# Patient Record
Sex: FEMALE | HISPANIC OR LATINO | Employment: FULL TIME | URBAN - METROPOLITAN AREA
[De-identification: names, ages, dates, MRNs, and addresses within clinical notes are randomized per-mention and may not be internally consistent; named-entity substitution may affect disease eponyms.]

---

## 2017-02-07 ENCOUNTER — NON-PROVIDER VISIT (OUTPATIENT)
Dept: URGENT CARE | Facility: PHYSICIAN GROUP | Age: 37
End: 2017-02-07

## 2017-02-07 DIAGNOSIS — Z02.83 EMPLOYMENT-RELATED DRUG TESTING, ENCOUNTER FOR: ICD-10-CM

## 2017-02-07 LAB
BREATH ALCOHOL COMMENT: NORMAL
POC BREATHALIZER: 0 PERCENT (ref 0–0.01)

## 2017-02-07 PROCEDURE — 82075 ASSAY OF BREATH ETHANOL: CPT | Performed by: FAMILY MEDICINE

## 2017-02-07 PROCEDURE — 7503 PR ESCREEN ACCT UDS COL ONLY: Performed by: PHYSICIAN ASSISTANT

## 2017-04-13 ENCOUNTER — NON-PROVIDER VISIT (OUTPATIENT)
Dept: URGENT CARE | Facility: PHYSICIAN GROUP | Age: 37
End: 2017-04-13

## 2017-04-13 DIAGNOSIS — Z02.83 ENCOUNTER FOR DRUG SCREENING: ICD-10-CM

## 2017-04-13 LAB
BREATH ALCOHOL COMMENT: NORMAL
POC BREATHALIZER: 0 PERCENT (ref 0–0.01)

## 2017-04-13 PROCEDURE — 82075 ASSAY OF BREATH ETHANOL: CPT | Performed by: FAMILY MEDICINE

## 2017-04-13 PROCEDURE — 7503 PR ESCREEN ACCT UDS COL ONLY: Performed by: FAMILY MEDICINE

## 2017-06-20 ENCOUNTER — OFFICE VISIT (OUTPATIENT)
Dept: MEDICAL GROUP | Facility: PHYSICIAN GROUP | Age: 37
End: 2017-06-20
Payer: COMMERCIAL

## 2017-06-20 VITALS
OXYGEN SATURATION: 97 % | WEIGHT: 148.2 LBS | HEIGHT: 61 IN | DIASTOLIC BLOOD PRESSURE: 80 MMHG | BODY MASS INDEX: 27.98 KG/M2 | TEMPERATURE: 98.8 F | HEART RATE: 70 BPM | SYSTOLIC BLOOD PRESSURE: 118 MMHG | RESPIRATION RATE: 12 BRPM

## 2017-06-20 DIAGNOSIS — Z01.30 VISIT FOR BLOOD PRESSURE EXAMINATION: ICD-10-CM

## 2017-06-20 DIAGNOSIS — K21.9 GASTROESOPHAGEAL REFLUX DISEASE WITHOUT ESOPHAGITIS: ICD-10-CM

## 2017-06-20 DIAGNOSIS — E78.5 HYPERLIPIDEMIA, UNSPECIFIED HYPERLIPIDEMIA TYPE: ICD-10-CM

## 2017-06-20 PROCEDURE — 99204 OFFICE O/P NEW MOD 45 MIN: CPT | Performed by: NURSE PRACTITIONER

## 2017-06-20 ASSESSMENT — PATIENT HEALTH QUESTIONNAIRE - PHQ9: CLINICAL INTERPRETATION OF PHQ2 SCORE: 0

## 2017-06-20 NOTE — MR AVS SNAPSHOT
"        Joellen Wakefield   2017 4:40 PM   Office Visit   MRN: [id number]    Department:  [facility]   Dept Phone:  [phone]    Description:  Female : 1980   Provider:  JOSE CRUZ Griffith           Reason for Visit     Establish Care     Orders Needed Labs       Allergies as of 2017     No Known Allergies      You were diagnosed with     Hyperlipidemia, unspecified hyperlipidemia type   [1524972]       Gastroesophageal reflux disease without esophagitis   [312761]         Vital Signs     Blood Pressure Pulse Temperature Respirations Height Weight    118/80 mmHg 70 37.1 °C (98.8 °F) 12 1.549 m (5' 0.98\") 67.223 kg (148 lb 3.2 oz)    Body Mass Index Oxygen Saturation Last Menstrual Period Breastfeeding? Smoking Status       28.02 kg/m2 97% 2017 No Never Smoker        Basic Information     Date Of Birth Sex Race Ethnicity Preferred Language    1980 Female  or   Origin (Polish,St Helenian,[place]i,Italian, etc) English      Problem List              ICD-10-CM Priority Class Noted - Resolved    Hyperlipidemia E78.5   2017 - Present    Gastroesophageal reflux disease without esophagitis K21.9   2017 - Present      Health Maintenance        Date Due Completion Dates    IMM DTaP/Tdap/Td Vaccine (1 - Tdap) 1997 ---    PAP SMEAR 1999 ---            Current Immunizations     No immunizations on file.      Below and/or attached are the medications your provider expects you to take. Review all of your home medications and newly ordered medications with your provider and/or pharmacist. Follow medication instructions as directed by your provider and/or pharmacist. Please keep your medication list with you and share with your provider. Update the information when medications are discontinued, doses are changed, or new medications (including over-the-counter products) are added; and carry medication information at all times in the " event of emergency situations     Allergies:  No Known Allergies          Medications  Valid as of: June 20, 2017 -  5:18 PM    Generic Name Brand Name Tablet Size Instructions for use    .                 Medicines prescribed today were sent to:     [facility] #[id number] - [place], [place] - [address]    [address] [place] [address]    Phone: [phone] Fax: [phone]    Open 24 Hours?: No      Medication refill instructions:       If your prescription bottle indicates you have medication refills left, it is not necessary to call your provider’s office. Please contact your pharmacy and they will refill your medication.    If your prescription bottle indicates you do not have any refills left, you may request refills at any time through one of the following ways: The online Resilinc system (except Urgent Care), by calling your provider’s office, or by asking your pharmacy to contact your provider’s office with a refill request. Medication refills are processed only during regular business hours and may not be available until the next business day. Your provider may request additional information or to have a follow-up visit with you prior to refilling your medication.   *Please Note: Medication refills are assigned a new Rx number when refilled electronically. Your pharmacy may indicate that no refills were authorized even though a new prescription for the same medication is available at the pharmacy. Please request the medicine by name with the pharmacy before contacting your provider for a refill.        Your To Do List     Future Labs/Procedures Complete By Expires    COMP METABOLIC PANEL  As directed 6/21/2018    LIPID PROFILE  As directed 6/21/2018         Resilinc Access Code: [email or web address]  Expires: 7/20/2017  4:02 PM    Resilinc  A secure, online tool to manage your health information     [employer]’s Resilinc® is a secure, online tool that connects you to your personalized  health information from the privacy of your home -- day or night - making it very easy for you to manage your healthcare. Once the activation process is completed, you can even access your medical information using the Mapplas yun, which is available for free in the Apple Yun store or Google Play store.     Mapplas provides the following levels of access (as shown below):   My Chart Features   [facility] Doctor [facility]  Specialists [facility]  Urgent  Care Non-[facility]  Primary Care  Doctor   Email your healthcare team securely and privately 24/7 X X X    Manage appointments: schedule your next appointment; view details of past/upcoming appointments X      Request prescription refills. X      View recent personal medical records, including lab and immunizations X X X X   View health record, including health history, allergies, medications X X X X   Read reports about your outpatient visits, procedures, consult and ER notes X X X X   See your discharge summary, which is a recap of your hospital and/or ER visit that includes your diagnosis, lab results, and care plan. X X       How to register for Mapplas:  1. Go to  [email or web address].  2. Click on the Sign Up Now box, which takes you to the New Member Sign Up page. You will need to provide the following information:  a. Enter your Mapplas Access Code exactly as it appears at the top of this page. (You will not need to use this code after you’ve completed the sign-up process. If you do not sign up before the expiration date, you must request a new code.)   b. Enter your date of birth.   c. Enter your home email address.   d. Click Submit, and follow the next screen’s instructions.  3. Create a Mapplas ID. This will be your Mapplas login ID and cannot be changed, so think of one that is secure and easy to remember.  4. Create a Mapplas password. You can change your password at any time.  5. Enter your Password Reset Question and Answer. This can be used at a  later time if you forget your password.   6. Enter your e-mail address. This allows you to receive e-mail notifications when new information is available in [employer].  7. Click Sign Up. You can now view your health information.    For assistance activating your [employer] account, call [phone]

## 2017-06-21 PROBLEM — Z01.30: Status: ACTIVE | Noted: 2017-06-21

## 2017-06-21 NOTE — ASSESSMENT & PLAN NOTE
Patient states that her blood pressure fluctuates. She currently does not check her. She denies any chest pain, diaphoresis, shortness of breath, headaches, dizziness or blurred vision. She was advised t keep a blood pressure log to follow-up and review in 4 weeks

## 2017-06-21 NOTE — PROGRESS NOTES
Chief Complaint   Patient presents with   • Establish Care   • Orders Needed     Labs        HPI:    Joellen Wakefield is a 36 y.o. female here to establish care and to discuss the following:    Hyperlipidemia  Chronic Condition: Patient has hyperlipidemia which is diet controlled. She denies any chest pain, diaphoresis, shortness of breath, headaches, dizziness, blurred vision or myalgias. Patient is a  Non-smoker and rarely drinks alcohol .       Gastroesophageal reflux disease without esophagitis  Chronic condition: Patient reports GERD for many years controlled well with omeprazole when necessary. Patient denies chest pain, pyrosis, regurgitation, chronic cough, dysphagia, chronic sore throat or hoarseness. Patient is a  Non-smokerandsocial drinker. Seen by gastroenterology several years ago and treated with medication. Endoscopy not necessary.    Visit for blood pressure examination  Patient states that her blood pressure fluctuates. She currently does not check her. She denies any chest pain, diaphoresis, shortness of breath, headaches, dizziness or blurred vision. She was advised t keep a blood pressure log to follow-up and review in 4 weeks        Current medicines (including changes today)  No current outpatient prescriptions on file.     No current facility-administered medications for this visit.     She  has a past medical history of Indigestion; Cold; Cold; Heart burn; and Hyperlipidemia.  She  has past surgical history that includes other orthopedic surgery and tubal coagulation laparoscopic bilateral (2/6/2012).  Social History   Substance Use Topics   • Smoking status: Never Smoker    • Smokeless tobacco: Never Used   • Alcohol Use: 0.0 oz/week     0 Standard drinks or equivalent per week      Comment: one drink every 2 weeks     Social History     Social History Narrative     History reviewed. No pertinent family history.  No family status information on file.         ROS    Review of Systems  "  Constitutional: Negative for fever, chills, weight loss and malaise/fatigue.   HENT: Negative for ear pain, nosebleeds, congestion, sore throat and neck pain.    Eyes: Negative for blurred vision.   Respiratory: Negative for cough, sputum production, shortness of breath and wheezing.    Cardiovascular: Negative for chest pain, palpitations,  and leg swelling.   Gastrointestinal: Negative for  nausea, vomiting, diarrhea and abdominal pain. Positive for intermittent GERD  Genitourinary: Negative for dysuria, urgency and frequency.   Musculoskeletal: Negative for myalgias, back pain and joint pain.   Skin: Negative for rash and itching.   Neurological: Negative for dizziness, tingling, tremors, sensory change, focal weakness and headaches.   Endo/Heme/Allergies: Does not bruise/bleed easily.   Psychiatric/Behavioral: Negative for depression, anxiety, suicidal ideas, insomnia and memory loss.      All other systems reviewed and are negative except as in HPI.         Objective:     Blood pressure 118/80, pulse 70, temperature 37.1 °C (98.8 °F), resp. rate 12, height 1.549 m (5' 0.98\"), weight 67.223 kg (148 lb 3.2 oz), last menstrual period 05/20/2017, SpO2 97 %, not currently breastfeeding. Body mass index is 28.02 kg/(m^2).  Physical Exam:  Constitutional: Alert, no distress.  Skin: Warm, dry, good turgor, no rashes in visible areas.  Eye: Equal, round and reactive, conjunctiva clear, lids normal.  ENMT: Lips without lesions, good dentition, oropharynx clear. Ear canals are clear, TMs within normal limits bilaterally.   Neck: Trachea midline, no masses, no thyromegaly. No cervical or supraclavicular lymphadenopathy.  Respiratory: Unlabored respiratory effort, lungs clear to auscultation, no wheezes, no ronchi.  Cardiovascular: Normal S1, S2, no murmur, no edema.  Abdomen: Soft, non-tender, no masses, no hepatosplenomegaly.  Psych: Alert and oriented x3, normal affect and mood.  MS: Ambulates independently with steady " gait. Has full range of motion of all extremities and spine.  Neuro: Cranial nerves intact. DTRs within normal limits. No neurological deficits.        Assessment and Plan:   The following treatment plan was discussed   1. Hyperlipidemia, unspecified hyperlipidemia type  COMP METABOLIC PANEL    LIPID PROFILE    labs ordered   2. Gastroesophageal reflux disease without esophagitis      Continue current medication   3. Visit for blood pressure examination       Records requested.  Followup: Return in about 4 weeks (around 7/18/2017) for Evaluate BP log.   Please note that this dictation was created using voice recognition software. I have made every reasonable attempt to correct obvious errors, but I expect that there are errors of grammar and possibly content that I did not discover before finalizing the note.

## 2017-06-21 NOTE — ASSESSMENT & PLAN NOTE
Chronic Condition: Patient has hyperlipidemia which is diet controlled. She denies any chest pain, diaphoresis, shortness of breath, headaches, dizziness, blurred vision or myalgias. Patient is a  Non-smoker and rarely drinks alcohol .

## 2017-06-21 NOTE — ASSESSMENT & PLAN NOTE
Chronic condition: Patient reports GERD for many years controlled well with omeprazole when necessary. Patient denies chest pain, pyrosis, regurgitation, chronic cough, dysphagia, chronic sore throat or hoarseness. Patient is a  Non-smokerandsocial drinker. Seen by gastroenterology several years ago and treated with medication. Endoscopy not necessary.

## 2017-11-20 ENCOUNTER — NON-PROVIDER VISIT (OUTPATIENT)
Dept: URGENT CARE | Facility: PHYSICIAN GROUP | Age: 37
End: 2017-11-20

## 2017-11-20 DIAGNOSIS — Z02.83 ENCOUNTER FOR DRUG SCREENING: ICD-10-CM

## 2017-11-20 PROCEDURE — 7503 PR ESCREEN ACCT UDS COL ONLY: Performed by: EMERGENCY MEDICINE

## 2019-02-27 ENCOUNTER — OFFICE VISIT (OUTPATIENT)
Dept: URGENT CARE | Facility: PHYSICIAN GROUP | Age: 39
End: 2019-02-27
Payer: COMMERCIAL

## 2019-02-27 VITALS
DIASTOLIC BLOOD PRESSURE: 88 MMHG | HEART RATE: 72 BPM | SYSTOLIC BLOOD PRESSURE: 124 MMHG | BODY MASS INDEX: 29.27 KG/M2 | HEIGHT: 61 IN | WEIGHT: 155 LBS | OXYGEN SATURATION: 98 % | TEMPERATURE: 97.9 F

## 2019-02-27 DIAGNOSIS — Z87.09 HISTORY OF INFLUENZA: ICD-10-CM

## 2019-02-27 DIAGNOSIS — R49.1 LOSS OF VOICE: ICD-10-CM

## 2019-02-27 PROCEDURE — 99214 OFFICE O/P EST MOD 30 MIN: CPT | Performed by: PHYSICIAN ASSISTANT

## 2019-02-27 RX ORDER — CHLORAL HYDRATE 500 MG
1000 CAPSULE ORAL
COMMUNITY
End: 2022-01-16

## 2019-02-27 RX ORDER — METHYLPREDNISOLONE 4 MG/1
TABLET ORAL
Qty: 1 KIT | Refills: 0 | Status: SHIPPED | OUTPATIENT
Start: 2019-02-27 | End: 2019-03-20

## 2019-02-27 RX ORDER — OSELTAMIVIR PHOSPHATE 75 MG/1
CAPSULE ORAL
Refills: 0 | COMMUNITY
Start: 2019-02-24 | End: 2019-03-20

## 2019-02-27 RX ORDER — ASCORBIC ACID 500 MG
500 TABLET ORAL DAILY
COMMUNITY
End: 2019-06-12

## 2019-02-27 ASSESSMENT — ENCOUNTER SYMPTOMS
SINUS PAIN: 0
NEUROLOGICAL NEGATIVE: 1
HEARTBURN: 0
FEVER: 0
STRIDOR: 0
CONSTIPATION: 0
VOMITING: 0
BLURRED VISION: 0
DOUBLE VISION: 0
SORE THROAT: 0
ABDOMINAL PAIN: 0
CHILLS: 0
DIARRHEA: 0
COUGH: 0
NAUSEA: 0

## 2019-02-27 NOTE — LETTER
February 27, 2019         Patient: Joellen Wakefield   YOB: 1980   Date of Visit: 2/27/2019           To Whom it May Concern:    Joellen Wakefield was seen in my clinic on 2/27/2019.  Please excuse on February 27 and February 28, 2019  If you have any questions or concerns, please don't hesitate to call.        Sincerely,           Kay Billingsley P.A.-C.  Electronically Signed

## 2019-02-28 NOTE — PROGRESS NOTES
"Subjective:      Joellen Wakefield is a 38 y.o. female who presents with Laryngitis (x1 month, sore throat, coughing up blood with phlegm, fever x2 days)            Patient reports 1 month history of loss of voice.  She states her voice is hoarse.  She states she has been told she has reflux but is not sure if she does.  Denies any history of problems with her thyroid.  Denies painful swallowing, belching or loss of weight or appetite.  Denies abdominal pain.  Was seen last week and treated for influenza.  She was given Tamiflu.  This made her voice loss worse.  Nothing makes better nothing makes worse.  She is tried only Tamiflu which seems to have helped her cough but not her loss of voice    Past medical history: Is not pertinent to this examination  Past surgical history: Not pertinent to this examination  Family history: Is not pertinent to this examination  Allergies: No known drug allergies  Social history: Is reviewed in Epic  Meds: She just finished Tamiflu        Review of Systems   Constitutional: Negative for chills and fever.   HENT: Negative for congestion, ear discharge, ear pain, nosebleeds, sinus pain and sore throat.    Eyes: Negative for blurred vision and double vision.   Respiratory: Negative for cough and stridor.    Cardiovascular: Negative for chest pain.   Gastrointestinal: Negative for abdominal pain, constipation, diarrhea, heartburn, nausea and vomiting.   Genitourinary: Negative.    Skin: Negative for rash.   Neurological: Negative.           Objective:     /88 (BP Location: Right arm, Patient Position: Sitting, BP Cuff Size: Adult)   Pulse 72   Temp 36.6 °C (97.9 °F) (Temporal)   Ht 1.549 m (5' 0.98\")   Wt 70.3 kg (155 lb)   LMP 02/13/2019   SpO2 98%   BMI 29.31 kg/m²      Physical Exam         Gen.: Patient is A and O ×3, no acute distress, well-nourished well-hydrated  Vitals: Are listed and unremarkable  HEENT: Heads normocephalic, atraumatic, PERRLA, extraocular " movements intact, TMs and oropharynx clear  Neck: Soft supple without cervical lymphadenopathy.  Questionable thyroidmegaly  Cardiovascular: Regular rate and rhythm normal S1 and S2. No murmurs, rubs or gallops  Lungs are clear to auscultation bilaterally. no wheezes rales or rhonchi  Abdomen is soft, nontender, nondistended with good bowel sounds, no hepatosplenomegaly  Skin: Is well perfused without evidence of rash or lesions  Neurological:  cranial nerves II through XII were assessed and intact.  Musculoskeletal: Full range of motion, 5 out of 5 strength against resistance  Neurovascularly: Intact with a 2 second cap refill, good distal pulses      Assessment/Plan:     1. History of influenza  -patient treated last week with tamiflu. Has improved.  Discussed viral etiology at length.    2. Loss of voice  Has been one month. She needs further evaluation given questionable thyroid megaly on exam.  Urgent referral to ENT  - REFERRAL TO ENT        Did send a Medrol Dosepak to the pharmacy as patient states symptoms worsened while having respiratory infection.  Discussed voicebox rest, drinking tea and will do short course of steroids.  Follow-up as needed

## 2019-03-20 ENCOUNTER — OFFICE VISIT (OUTPATIENT)
Dept: MEDICAL GROUP | Facility: PHYSICIAN GROUP | Age: 39
End: 2019-03-20
Payer: COMMERCIAL

## 2019-03-20 VITALS
BODY MASS INDEX: 27.46 KG/M2 | SYSTOLIC BLOOD PRESSURE: 118 MMHG | TEMPERATURE: 98.5 F | DIASTOLIC BLOOD PRESSURE: 84 MMHG | WEIGHT: 155 LBS | OXYGEN SATURATION: 98 % | HEIGHT: 63 IN | HEART RATE: 78 BPM | RESPIRATION RATE: 12 BRPM

## 2019-03-20 DIAGNOSIS — E78.5 HYPERLIPIDEMIA, UNSPECIFIED HYPERLIPIDEMIA TYPE: ICD-10-CM

## 2019-03-20 DIAGNOSIS — Z12.31 ENCOUNTER FOR SCREENING MAMMOGRAM FOR MALIGNANT NEOPLASM OF BREAST: ICD-10-CM

## 2019-03-20 DIAGNOSIS — Z11.59 ENCOUNTER FOR HEPATITIS C SCREENING TEST FOR LOW RISK PATIENT: ICD-10-CM

## 2019-03-20 DIAGNOSIS — R03.0 ELEVATED BLOOD PRESSURE READING: ICD-10-CM

## 2019-03-20 DIAGNOSIS — R53.83 FATIGUE, UNSPECIFIED TYPE: ICD-10-CM

## 2019-03-20 DIAGNOSIS — R73.9 HYPERGLYCEMIA: ICD-10-CM

## 2019-03-20 DIAGNOSIS — Z11.3 ROUTINE SCREENING FOR STI (SEXUALLY TRANSMITTED INFECTION): ICD-10-CM

## 2019-03-20 DIAGNOSIS — E53.8 VITAMIN B12 DEFICIENCY: ICD-10-CM

## 2019-03-20 DIAGNOSIS — Z51.81 MEDICATION MONITORING ENCOUNTER: ICD-10-CM

## 2019-03-20 DIAGNOSIS — K76.0 FATTY LIVER: ICD-10-CM

## 2019-03-20 DIAGNOSIS — R01.1 CARDIAC MURMUR: ICD-10-CM

## 2019-03-20 DIAGNOSIS — K21.9 GASTROESOPHAGEAL REFLUX DISEASE WITHOUT ESOPHAGITIS: ICD-10-CM

## 2019-03-20 DIAGNOSIS — E55.9 VITAMIN D DEFICIENCY: ICD-10-CM

## 2019-03-20 DIAGNOSIS — R74.8 ELEVATED LIVER ENZYMES: ICD-10-CM

## 2019-03-20 PROBLEM — Z01.30: Status: RESOLVED | Noted: 2017-06-21 | Resolved: 2019-03-20

## 2019-03-20 PROCEDURE — 99214 OFFICE O/P EST MOD 30 MIN: CPT | Performed by: FAMILY MEDICINE

## 2019-03-20 RX ORDER — FLUTICASONE PROPIONATE 50 MCG
SPRAY, SUSPENSION (ML) NASAL
Refills: 0 | COMMUNITY
Start: 2019-03-14 | End: 2019-06-12 | Stop reason: SDUPTHER

## 2019-03-20 RX ORDER — OMEPRAZOLE 20 MG/1
CAPSULE, DELAYED RELEASE ORAL
Refills: 0 | COMMUNITY
Start: 2019-03-14 | End: 2019-03-20 | Stop reason: SDUPTHER

## 2019-03-20 RX ORDER — OMEPRAZOLE 20 MG/1
20 CAPSULE, DELAYED RELEASE ORAL DAILY
Qty: 30 CAP | Refills: 1 | Status: SHIPPED | OUTPATIENT
Start: 2019-03-20 | End: 2019-06-12 | Stop reason: SDUPTHER

## 2019-03-20 ASSESSMENT — PATIENT HEALTH QUESTIONNAIRE - PHQ9: CLINICAL INTERPRETATION OF PHQ2 SCORE: 0

## 2019-03-20 NOTE — PROGRESS NOTES
cc: BMI 27, history of elevated liver enzymes and fatty liver, GERD, employment screening with hyperglycemia    Subjective:     Joellen Wakefield is a 38 y.o. female presenting BMI 27, history of elevated liver enzymes and fatty liver, GERD, employment screening with hyperglycemia.  She denies any recent ER visits or hospitalizations.  She is here to establish care with myself.  February 27 was seen in urgent care here due to loss of voice and history of influenza where she was treated with Tamiflu and improved because her loss of voice has been for 1 month, she was referred to the ear nose and throat specialist.  April 11 she has appointment with ENT.  She has history of elevated liver function test and when she saw her previous doctor at [facility] she was told she had fatty liver.  She has not seen a dietitian before but was told to work on her weight due to her fatty liver and she did have hyperglycemia at her employment exam but never any diabetes but does have family history with both parents with diabetes.  Lives with her kids and boyfriend. two girls 19,16, and 10 and one boy. Works full time web planner. No social or domestic concerns. She reports transient elevated blood pressure. But BP okay today.  She reports sometimes that her head bobs and she could feel her heartbeat in her ears.  She denies knowing of any murmur from before.  She denies any chest pain or palpitations.  Informant screening total cholesterol was 171 and triglycerides was 117 previous ALT patient reports was 53 as she had that in her phone.  Review of systems:     Constitutional: Negative for fever, chills and positive fatigue.   HENT: Negative for sinus pressure, negative for ear pain or hearing loss  Eyes: Negative for blurriness, negative for double vision  Respiratory: Negative for cough and shortness of breath, negative for exertional shortness of breath  Cardiovascular: Negative for leg swelling, negative for palpitations,  "negative for chest pain  Gastrointestinal: Negative for nausea, vomiting, abdominal pain, constipation and diarrhea.  Genitourinary: Negative for dysuria and hematuria.   Skin: Negative for rash.   Neurological: Positive for dizziness, Negative focal weakness and headaches.   Endo/Heme/Allergies: Denies bleeding, bruising, and recurrent allergies.  Psychiatric/Behavioral: Negative for depression and anxiety.        Current Outpatient Prescriptions:   •  fluticasone (FLONASE) 50 MCG/ACT nasal spray, , Disp: , Rfl: 0  •  omeprazole (PRILOSEC) 20 MG delayed-release capsule, , Disp: , Rfl: 0  •  Omega-3 Fatty Acids (FISH OIL) 1000 MG Cap capsule, Take 1,000 mg by mouth 3 times a day, with meals., Disp: , Rfl:   •  ascorbic acid (ASCORBIC ACID) 500 MG Tab, Take 500 mg by mouth every day., Disp: , Rfl:     Allergies, past medical history, past surgical history, family history, social history reviewed and updated    Objective:     Vitals: /84 (BP Location: Left arm, Patient Position: Sitting, BP Cuff Size: Adult)   Pulse 78   Temp 36.9 °C (98.5 °F) (Temporal)   Resp 12   Ht 1.588 m (5' 2.5\")   Wt 70.3 kg (155 lb)   LMP 02/25/2019 (Approximate)   SpO2 98%   Breastfeeding? No   BMI 27.90 kg/m²   General: Alert, pleasant, NAD  HEENT: Normocephalic.  Nontraumatic. EOMI, no icterus or pallor.  Conjunctivae and lids normal. External ears normal. Oropharynx non-erythematous, mucous membranes moist.  Neck supple.  No thyromegaly or masses palpated. No cervical or supraclavicular lymphadenopathy.  Heart: Regular rate and rhythm.  S1 and S2 normal.  No murmurs appreciated.  Respiratory: Normal respiratory effort.  Clear to auscultation bilaterally.  Abdomen: Non-distended, soft, non tender in all 4 quadrants.  Skin: Warm, dry, no rashes.  Musculoskeletal: Gait is normal.  Moves all extremities well.  Extremities: No leg edema.  Pedal pulses 2+ symmetric.   Psych:  Affect/mood is normal, judgement is good, memory is " intact, grooming is appropriate.    Assessment/Plan:     Diagnoses and all orders for this visit:    BMI 27.0-27.9,adult  -     REFERRAL TO [facility] (HIP) Services Requested: Registered Dietitian for Medical Nutrition Therapy; Reason for Visit: BMI of 25 or higher and Fasting Glucose 100-125    Encounter for screening mammogram for malignant neoplasm of breast  -     MA-SCREEN MAMMO W/CAD-BILAT; Future    Hyperlipidemia, unspecified hyperlipidemia type  -     Lipid Profile; Future    Gastroesophageal reflux disease without esophagitis    Fatigue, unspecified type  -     CBC WITH DIFFERENTIAL; Future  -     TSH WITH REFLEX TO FT4; Future    Medication monitoring encounter  -     Comp Metabolic Panel; Future    Hyperglycemia  -     HEMOGLOBIN A1C; Future  -     REFERRAL TO [facility] (HIP) Services Requested: Registered Dietitian for Medical Nutrition Therapy; Reason for Visit: BMI of 25 or higher and Fasting Glucose 100-125    Elevated liver enzymes  -     Comp Metabolic Panel; Future  -     HEPATITIS PANEL ACUTE(4 COMPONENTS); Future    Vitamin D deficiency  -     VITAMIN D,25 HYDROXY; Future    Vitamin B12 deficiency  -     VITAMIN B12; Future    Encounter for hepatitis C screening test for low risk patient  -     HEPATITIS PANEL ACUTE(4 COMPONENTS); Future    Routine screening for STI (sexually transmitted infection)  -     Chlamydia/GC PCR Urine Or Swab; Future  -     HEPATITIS PANEL ACUTE(4 COMPONENTS); Future  -     HIV AG/AB COMBO ASSAY SCREENING; Future  -     RPR (SYPHILIS); Future    Fatty liver  -     REFERRAL TO [facility] (HIP) Services Requested: Registered Dietitian for Medical Nutrition Therapy; Reason for Visit: BMI of 25 or higher and Fasting Glucose 100-125    Elevated blood pressure reading    -Due to her head Alex issues before you see me for my appointment, please see the eye doctor and please let the ear nose throat doctor  know as well when you see him April 11.  Continue daily on an empty stomach in the morning omeprazole for GERD once daily for at least 30 days refilled with one other refill.  Extensive patient instructions given on diet for GERD.  Also for BMI 27 and history of fatty liver will send to the dietitian.  2 weeks before your next appointment please get fasting lab work 8 hours of no eating but drink water and we will get an A1c for hyperglycemia.  She is due for her breast cancer screening and will send her for mammogram as well.  Also due to her new murmur and her current symptoms will get ultrasound echo of the heart.  Also recommend getting a blood pressure cuff and checking the blood pressure in both arms the top number and bottom number and heart rate checking sometimes in the morning, afternoon, or evening every second day until you see me and putting these values on the form that is in the patient instruction.  Ideal blood pressure is 120s over 80s.  Patient instructions given on diet and blood pressure.  ER precautions given that if any chest pain, shortness of breath, passing out then please go to the ER or call 911.  Also please request to have records sent from the ear nose throat doctor to ask your primary care doctor and also please send over forms from her employment screening labs as well.    Return in about 6 weeks (around 4/30/2019), or FU labs/ears/BP, for Long, 40 min appnt.

## 2019-03-20 NOTE — PATIENT INSTRUCTIONS
Diagnoses and all orders for this visit:    BMI 27.0-27.9,adult  -     REFERRAL TO [facility] (HIP) Services Requested: Registered Dietitian for Medical Nutrition Therapy; Reason for Visit: BMI of 25 or higher and Fasting Glucose 100-125    Encounter for screening mammogram for malignant neoplasm of breast  -     MA-SCREEN MAMMO W/CAD-BILAT; Future    Hyperlipidemia, unspecified hyperlipidemia type  -     Lipid Profile; Future    Gastroesophageal reflux disease without esophagitis    Fatigue, unspecified type  -     CBC WITH DIFFERENTIAL; Future  -     TSH WITH REFLEX TO FT4; Future    Medication monitoring encounter  -     Comp Metabolic Panel; Future    Hyperglycemia  -     HEMOGLOBIN A1C; Future  -     REFERRAL TO [facility] (HIP) Services Requested: Registered Dietitian for Medical Nutrition Therapy; Reason for Visit: BMI of 25 or higher and Fasting Glucose 100-125    Elevated liver enzymes  -     Comp Metabolic Panel; Future  -     HEPATITIS PANEL ACUTE(4 COMPONENTS); Future    Vitamin D deficiency  -     VITAMIN D,25 HYDROXY; Future    Vitamin B12 deficiency  -     VITAMIN B12; Future    Encounter for hepatitis C screening test for low risk patient  -     HEPATITIS PANEL ACUTE(4 COMPONENTS); Future    Routine screening for STI (sexually transmitted infection)  -     Chlamydia/GC PCR Urine Or Swab; Future  -     HEPATITIS PANEL ACUTE(4 COMPONENTS); Future  -     HIV AG/AB COMBO ASSAY SCREENING; Future  -     RPR (SYPHILIS); Future    Fatty liver  -     REFERRAL TO [facility] (HIP) Services Requested: Registered Dietitian for Medical Nutrition Therapy; Reason for Visit: BMI of 25 or higher and Fasting Glucose 100-125    Elevated blood pressure reading    -Due to her head Alex issues before you see me for my appointment, please see the eye doctor and please let the ear nose throat doctor know as well when you see him April 11.  Continue daily  on an empty stomach in the morning omeprazole for GERD once daily for at least 30 days refilled with one other refill.  Extensive patient instructions given on diet for GERD.  Also for BMI 27 and history of fatty liver will send to the dietitian.  2 weeks before your next appointment please get fasting lab work 8 hours of no eating but drink water and we will get an A1c for hyperglycemia.  She is due for her breast cancer screening and will send her for mammogram as well.  Also due to her new murmur and her current symptoms will get ultrasound echo of the heart.  Also recommend getting a blood pressure cuff and checking the blood pressure in both arms the top number and bottom number and heart rate checking sometimes in the morning, afternoon, or evening every second day until you see me and putting these values on the form that is in the patient instruction.  Ideal blood pressure is 120s over 80s.  Patient instructions given on diet and blood pressure.  ER precautions given that if any chest pain, shortness of breath, passing out then please go to the ER or call 911.  Also please request to have records sent from the ear nose throat doctor to ask your primary care doctor and also please send over forms from her employment screening labs as well.    Return in about 6 weeks (around 4/30/2019), or FU labs/ears/BP, for Long, 40 min appnt.  How to Take Your Blood Pressure  Blood pressure is a measurement of how strongly your blood is pressing against the walls of your arteries. Arteries are blood vessels that carry blood from your heart throughout your body. Your health care provider takes your blood pressure at each office visit. You can also take your own blood pressure at home with a blood pressure machine. You may need to take your own blood pressure:  · To confirm a diagnosis of high blood pressure (hypertension).  · To monitor your blood pressure over time.  · To make sure your blood pressure medicine is  working.  Supplies needed:  To take your blood pressure, you will need a blood pressure machine. You can buy a blood pressure machine, or blood pressure monitor, at most [employer] or online. There are several types of home blood pressure monitors. When choosing one, consider the following:  · Choose a monitor that has an arm cuff.  · Choose a monitor that wraps snugly around your upper arm. You should be able to fit only one finger between your arm and the cuff.  · Do not choose a monitor that measures your blood pressure from your wrist or finger.  Your health care provider can suggest a reliable monitor that will meet your needs.  How to prepare  To get the most accurate reading, avoid the following for 30 minutes before you check your blood pressure:  · Drinking caffeine.  · Drinking alcohol.  · Eating.  · Smoking.  · Exercising.  Five minutes before you check your blood pressure:  · Empty your bladder.  · Sit quietly without talking in a dining chair, rather than in a soft couch or armchair.  How to take your blood pressure  To check your blood pressure, follow the instructions in the manual that came with your blood pressure monitor. If you have a digital blood pressure monitor, the instructions may be as follows:  1. Sit up straight.  2. Place your feet on the floor. Do not cross your ankles or legs.  3. Rest your left arm at the level of your heart on a table or desk or on the arm of a chair.  4. Pull up your shirt sleeve.  5. Wrap the blood pressure cuff around the upper part of your left arm, 1 inch (2.5 cm) above your elbow. It is best to wrap the cuff around bare skin.  6. Fit the cuff snugly around your arm. You should be able to place only one finger between the cuff and your arm.  7. Position the cord inside the groove of your elbow.  8. Press the power button.  9. Sit quietly while the cuff inflates and deflates.  10. Read the digital reading on the monitor screen and write it down (record  "it).  11. Wait 2-3 minutes, then repeat the steps starting at step 1.  What does my blood pressure reading mean?  A blood pressure reading is recorded as two numbers, such as \"120 over 80\" (or 120/80). The first (\"top\") number is called the systolic pressure. It is a measure of the pressure in your arteries as the heart beats. The second (\"bottom\") number is called the diastolic pressure. It is a measure of the pressure in your arteries as the heart relaxes between beats.  Blood pressure is classified into four stages. The following are the stages for adults who do not have a short-term serious illness or a chronic condition. Systolic pressure and diastolic pressure are measured in a unit called mm Hg.  Normal  · Systolic pressure: below 120.  · Diastolic pressure: below 80.  Prehypertension  · Systolic pressure: 120-139.  · Diastolic pressure: 80-89.  Hypertension stage 1  · Systolic pressure: 140-159.  · Diastolic pressure: 90-99.  Hypertension stage 2  · Systolic pressure: 160 or above.  · Diastolic pressure: 100 or above.  You can have prehypertension or hypertension even if only the systolic or only the diastolic number in your reading is higher than normal.  Follow these instructions at home:  · Check your blood pressure as often as recommended by your health care provider.  · Take your monitor to the next appointment with your health care provider to make sure:  ¨ That you are using it correctly.  ¨ That it provides accurate readings.  · Be sure you understand what your goal blood pressure numbers are.  · Tell your health care provider if you are having any side effects from blood pressure medicine.  Contact a health care provider if:  · Your blood pressure is consistently high.  Get help right away if:  · Your systolic blood pressure is higher than 180.  · Your diastolic blood pressure is higher than 110.  This information is not intended to replace advice given to you by your health care provider. Make sure " "you discuss any questions you have with your health care provider.     Introduction  Your blood pressure on this visit to the emergency department or clinic is elevated. This does not necessarily mean you have high blood pressure (hypertension), but it does mean that your blood pressure needs to be rechecked. Many times your blood pressure can increase due to illness, pain, anxiety, or other factors.  We recommend that you get a series of blood pressure readings done over a period of 5 days. It is best to get a reading in the morning and one in the evening. You should make sure to sit and relax for 1-5 minutes before the reading is taken. Write the readings down and make a follow-up appointment with your health care provider to discuss the results. If there is not a free clinic or a drug store with a blood-pressure-taking machine near you, you can purchase blood-pressure-taking equipment from a drug store. Having one in the home allows you the convenience of taking your blood pressure while you are home and relaxed.  BLOOD PRESSURE LOG   Date: _______________________  · a.m. _____________________  · p.m. _____________________  Date: _______________________  · a.m. _____________________  · p.m. _____________________  Date: _______________________  · a.m. _____________________  · p.m. _____________________  Date: _______________________  · a.m. _____________________  · p.m. _____________________  Date: _______________________  · a.m. _____________________  · p.m. _____________________  This information is not intended to replace advice given to you by your health care provider. Make sure you discuss any questions you have with your health care provider.    Heart Murmur  A heart murmur is an extra sound heard by your health care provider when listening to your heart with a device called a stethoscope. The sound comes from turbulence when blood flows through the heart and may be a \"hum\" or \"whoosh\" sound heard when the " heart beats. There are two types of heart murmurs:  · Innocent murmurs. Most people with this type of heart murmur do not have a heart problem. Many children have innocent heart murmurs. Your health care provider may suggest some basic testing to know whether your murmur is an innocent murmur. If an innocent heart murmur is found, there is no need for further tests or treatment and no need to restrict activities or stop playing sports.  · Abnormal murmurs. These types of murmurs can occur in children and adults. In children, abnormal heart murmurs are typically caused from heart defects that are present at birth (congenital). In adults, abnormal murmurs are usually from heart valve problems caused by disease, infection, or aging.  CAUSES   Normally, these valves open to let blood flow through or out of your heart and then shut to keep it from flowing backward. If they do not work properly, you could have:  · Regurgitation--When blood leaks back through the valve in the wrong direction.  · Mitral valve prolapse--When the mitral valve of the heart has a loose flap and does not close tightly.  · Stenosis--When the valve does not open enough and blocks blood flow.  SIGNS AND SYMPTOMS   Innocent murmurs do not cause symptoms, and many people with abnormal murmurs may or may not have symptoms. If symptoms do develop, they may include:  · Shortness of breath.  · Blue coloring of the skin, especially on the fingertips.  · Chest pain.  · Palpitations, or feeling a fluttering or skipped heartbeat.  · Fainting.  · Persistent cough.  · Getting tired much faster than expected.  DIAGNOSIS   A heart murmur might be heard during a sports physical or during any type of examination. When a murmur is heard, it may suggest a possible problem. When this happens, your health care provider may ask you to see a heart specialist (cardiologist). You may also be asked to have one or more heart tests. In these cases, testing may vary depending  on what your health care provider heard. Tests for a heart murmur may include:  · Electrocardiogram.  · Echocardiogram.  · MRI.  For children and adults who have an abnormal heart murmur and want to play sports, it is important to complete testing, review test results, and receive recommendations from your health care provider. If heart disease is present, it may not be safe to play.  TREATMENT   Innocent murmurs require no treatment or activity restriction. If an abnormal murmur represents a problem with the heart, treatment will depend on the exact nature of the problem. In these cases, medicine or surgery may be needed to treat the problem.  HOME CARE INSTRUCTIONS  If you want to participate in sports or other types of strenuous physical activity, it is important to discuss this first with your health care provider. If the murmur represents a problem with the heart and you choose to participate in sports, there is a small chance that a serious problem (including sudden death) could result.   SEEK MEDICAL CARE IF:   · You feel that your symptoms are slowly worsening.  · You develop any new symptoms that cause concern.  · You feel that you are having side effects from any medicines prescribed.  SEEK IMMEDIATE MEDICAL CARE IF:   · You develop chest pain.  · You have shortness of breath.  · You notice that your heart beats irregularly often enough to cause you to worry.  · You have fainting spells.  · Your symptoms suddenly get worse.  This information is not intended to replace advice given to you by your health care provider. Make sure you discuss any questions you have with your health care provider.  Food Choices for Gastroesophageal Reflux Disease, Adult  When you have gastroesophageal reflux disease (GERD), the foods you eat and your eating habits are very important. Choosing the right foods can help ease the discomfort of GERD.  WHAT GENERAL GUIDELINES DO I NEED TO FOLLOW?  · Choose fruits, vegetables, whole  grains, low-fat dairy products, and low-fat meat, fish, and poultry.  · Limit fats such as oils, salad dressings, butter, nuts, and avocado.  · Keep a food diary to identify foods that cause symptoms.  · Avoid foods that cause reflux. These may be different for different people.  · Eat frequent small meals instead of three large meals each day.  · Eat your meals slowly, in a relaxed setting.  · Limit fried foods.  · Cook foods using methods other than frying.  · Avoid drinking alcohol.  · Avoid drinking large amounts of liquids with your meals.  · Avoid bending over or lying down until 2-3 hours after eating.  WHAT FOODS ARE NOT RECOMMENDED?  The following are some foods and drinks that may worsen your symptoms:  Vegetables  Tomatoes. Tomato juice. Tomato and spaghetti sauce. Chili peppers. Onion and garlic. Horseradish.  Fruits  Oranges, grapefruit, and lemon (fruit and juice).  Meats  High-fat meats, fish, and poultry. This includes hot dogs, ribs, ham, sausage, salami, and izaguirre.  Dairy  Whole milk and chocolate milk. Sour cream. Cream. Butter. Ice cream. Cream cheese.   Beverages  Coffee and tea, with or without caffeine. Carbonated beverages or energy drinks.  Condiments  Hot sauce. Barbecue sauce.   Sweets/Desserts  Chocolate and cocoa. Donuts. Peppermint and spearmint.  Fats and Oils  High-fat foods, including French fries and potato chips.  Other  Vinegar. Strong spices, such as black pepper, white pepper, red pepper, cayenne, perez powder, cloves, ginger, and chili powder.  The items listed above may not be a complete list of foods and beverages to avoid. Contact your dietitian for more information.     This information is not intended to replace advice given to you by your health care provider. Make sure you discuss any questions you have with your health care provider.

## 2019-04-04 ENCOUNTER — NON-PROVIDER VISIT (OUTPATIENT)
Dept: HEALTH INFORMATION MANAGEMENT | Facility: MEDICAL CENTER | Age: 39
End: 2019-04-04
Payer: COMMERCIAL

## 2019-04-04 VITALS — WEIGHT: 155.1 LBS | HEIGHT: 62 IN | BODY MASS INDEX: 28.54 KG/M2

## 2019-04-04 DIAGNOSIS — R73.9 HYPERGLYCEMIA: ICD-10-CM

## 2019-04-04 DIAGNOSIS — K76.0 FATTY LIVER: ICD-10-CM

## 2019-04-04 PROCEDURE — 97802 MEDICAL NUTRITION INDIV IN: CPT | Performed by: DIETITIAN, REGISTERED

## 2019-04-04 NOTE — PROGRESS NOTES
"4/4/2019    Argelia Rivera  38 y.o.   Time in/out: 736-830 am    Anthropometrics/Objective  Vitals:    04/04/19 0832   Weight: 70.4 kg (155 lb 1.6 oz)   Height: 1.575 m (5' 2\")       Body mass index is 28.37 kg/m².    Stated Goal Weight: 130 lbs   Estimated Caloric needs ~1450 based on [place] equation    See comprehensive patient history form for further information     Subjective:  - wants to lose weight and learn what to eat  - pt is concerned with her cholesterol, fatty liver, and elevated blood sugars especially as she has a family hx of diabetes  - not currently exercising   - answered not knowing what to eat as most challenging part about meal planning  - fast food 1-2x per week ([employer] Out, pizza, [employer]way)  - sweetened cereal, oatmeal w/ brown sugar, quesadilla with cheese or white bread with butter and jelly on top   - lunch and dinner mainly traditional  food beans, rice, tortillas or foot long subway with chips if she skips breakfast, lentil soup or pasta      Nutrition Diagnosis (PES Statement)  · Excessive carbohydrate intake related to pt's preference for traditional  foods and sweet breakfast foods as evidenced by large intake of beans, rice, and tortillas at meals, juice and soda weekly, fruit, oatmeal, brown sugar, sweetened cereals daily.     Client history:  Condition(s) associated with a diagnosis or treatment (specify) fatty liver,     Biochemical data, medical test and procedures  No results found for: HBA1C@  No results found for: POCGLUCOSE  No results found for: CHOLSTRLTOT, LDL, HDL, TRIGLYCERIDE      Nutrition Intervention      Meal and Snack  Recommend a general/healthful diet, carb controlled     Comprehensive Nutrition education Instruction or training leading to in-depth nutrition related knowledge about:  Combine carb, protein and fat at each meal, Eating out, Fast food, Meal timing and spacing, Physical activity/exercise, Portion control, Heart-healthy guidelines " "and Handouts provided regarding topics discussed:   - Plate Planner   - Nutrition Basics     Monitoring & Evaluation Plan    Behavioral-Environmental:  Physical activity: Joellen has decided to start walking after work around her neighborhood for 30 minutes with her cousin. Suggested [employer] jyoti for step tracker.    Food / Nutrient Intake:  Fluid/Beverage intake: Avoid sugar sweetened beverages and juice  Food intake: Follow the plate method for meal balance and portion control     Physical Signs / Symptoms:  Lipid profiles WNL and Weight change 1-2 lbs a week     Assessment Notes:  Joellen is consuming an excessive amount of carbohydrates. Discussed choosing complex whole grain carbohydrate choices and avoiding simple sugars and refined carbs. After showing her the plate method for meal balance and portion control she realizes she is getting too much carb at all of her meals. For breakfast I have instructed her to avoid sugar sweetened cereals like Ashley Crispies and white bread and reviewed some alternative options like whole wheat toast with avocado instead of butter or a corn or whole wheat quesadilla with cheese and an egg for alternatives. She needs to avoid skipping breakfast because she realizes she gets overly hungry leading to excess CHO consumption at lunch when she does. For lunch she often has 4-5 corn tortillas, 1/2-1 cup beans and 2 cups of rice. Instructed her to limit to 1 cup or \"fist\" size and instead fill up on non-starchy vegetables, heart healthy fats like avocado and lean protein sources like chicken breast and fish. Discussed heart healthy guidelines for choosing less saturated fats and more mono and polyunsaturated fats. Lastly, Joellen is sitting most of the day so we discussed the importance of exercise for weight management and chronic disease prevention. She has decided to start walking.     Joellen agreed to the following goals on today's visit:  1) No more than cup or \"1 fist\" of " carbohydrate per meal (1 cup beans or rice, or 2 tortillas, or 1 tortilla and 1/2 cup beans)  2) Have protein at breakfast, avoid sugar sweetened cereals   3) Walk after work with your cousin 30 minutes, try using Step Z jyoti     Follow up: 1 month

## 2019-04-06 ENCOUNTER — HOSPITAL ENCOUNTER (OUTPATIENT)
Dept: RADIOLOGY | Facility: MEDICAL CENTER | Age: 39
End: 2019-04-06
Attending: FAMILY MEDICINE
Payer: COMMERCIAL

## 2019-04-06 DIAGNOSIS — Z12.31 VISIT FOR SCREENING MAMMOGRAM: ICD-10-CM

## 2019-04-06 PROCEDURE — 77067 SCR MAMMO BI INCL CAD: CPT

## 2019-04-10 ENCOUNTER — TELEPHONE (OUTPATIENT)
Dept: MEDICAL GROUP | Facility: PHYSICIAN GROUP | Age: 39
End: 2019-04-10

## 2019-04-11 ENCOUNTER — TELEPHONE (OUTPATIENT)
Dept: MEDICAL GROUP | Facility: PHYSICIAN GROUP | Age: 39
End: 2019-04-11

## 2019-04-15 ENCOUNTER — HOSPITAL ENCOUNTER (OUTPATIENT)
Dept: CARDIOLOGY | Facility: MEDICAL CENTER | Age: 39
End: 2019-04-15
Attending: FAMILY MEDICINE
Payer: COMMERCIAL

## 2019-04-15 DIAGNOSIS — R01.1 CARDIAC MURMUR: ICD-10-CM

## 2019-04-15 LAB
LV EJECT FRACT  99904: 65
LV EJECT FRACT MOD 2C 99903: 72.13
LV EJECT FRACT MOD 4C 99902: 75.61
LV EJECT FRACT MOD BP 99901: 74.67

## 2019-04-15 PROCEDURE — 93306 TTE W/DOPPLER COMPLETE: CPT

## 2019-04-15 PROCEDURE — 93306 TTE W/DOPPLER COMPLETE: CPT | Mod: 26 | Performed by: INTERNAL MEDICINE

## 2019-04-22 ENCOUNTER — TELEPHONE (OUTPATIENT)
Dept: MEDICAL GROUP | Facility: PHYSICIAN GROUP | Age: 39
End: 2019-04-22

## 2019-04-22 NOTE — TELEPHONE ENCOUNTER
1. Caller Name: [facility]                                         Call Back Number: [phone]      Patient approves a detailed voicemail message: N\A    Coding needs pts echo order to be printed and signed and then fax/email back to coding. Fax number: [phone]

## 2019-04-30 ENCOUNTER — OFFICE VISIT (OUTPATIENT)
Dept: MEDICAL GROUP | Facility: PHYSICIAN GROUP | Age: 39
End: 2019-04-30
Payer: COMMERCIAL

## 2019-04-30 VITALS
OXYGEN SATURATION: 98 % | BODY MASS INDEX: 28.3 KG/M2 | DIASTOLIC BLOOD PRESSURE: 78 MMHG | HEART RATE: 71 BPM | HEIGHT: 62 IN | RESPIRATION RATE: 12 BRPM | TEMPERATURE: 98.2 F | SYSTOLIC BLOOD PRESSURE: 122 MMHG | WEIGHT: 153.8 LBS

## 2019-04-30 DIAGNOSIS — K76.0 FATTY LIVER: ICD-10-CM

## 2019-04-30 DIAGNOSIS — K21.9 GASTROESOPHAGEAL REFLUX DISEASE WITHOUT ESOPHAGITIS: ICD-10-CM

## 2019-04-30 DIAGNOSIS — Z23 NEED FOR VACCINATION: ICD-10-CM

## 2019-04-30 DIAGNOSIS — R92.30 DENSE BREAST TISSUE ON MAMMOGRAM: ICD-10-CM

## 2019-04-30 DIAGNOSIS — R01.1 CARDIAC MURMUR: ICD-10-CM

## 2019-04-30 DIAGNOSIS — J30.1 SEASONAL ALLERGIC RHINITIS DUE TO POLLEN: ICD-10-CM

## 2019-04-30 PROCEDURE — 90471 IMMUNIZATION ADMIN: CPT | Performed by: FAMILY MEDICINE

## 2019-04-30 PROCEDURE — 99214 OFFICE O/P EST MOD 30 MIN: CPT | Mod: 25 | Performed by: FAMILY MEDICINE

## 2019-04-30 PROCEDURE — 90715 TDAP VACCINE 7 YRS/> IM: CPT | Performed by: FAMILY MEDICINE

## 2019-04-30 RX ORDER — MONTELUKAST SODIUM 10 MG/1
10 TABLET ORAL DAILY
Qty: 30 TAB | Refills: 3 | Status: SHIPPED | OUTPATIENT
Start: 2019-04-30 | End: 2019-06-12 | Stop reason: SDUPTHER

## 2019-04-30 NOTE — PROGRESS NOTES
cc: Dense breast, seasonal allergies, GERD, Tdap vaccine, BMI 28    Subjective:     Joellen Wakefield is a 38 y.o. female presenting Unable to do lab work as she has a check with her insurance.  She saw her ear nose throat doctor who put her on nasal spray for her allergies and omeprazole for her acid reflux.  She has a deviated septum and he did offer to do surgery for that but she wants to wait for the surgery.  She is doing the Flonase every morning and the omeprazole which is helping.  She went to see the optometrist that says she may have some possible cataract in her right eye which she is going to monitor and follow and refer her to the ophthalmologist as needed.  Reports she still has some nasal congestion and allergies when she wakes up in the morning and has never tried any montelukast or Singulair.  She lost 2 pounds from her last month.  He still has to go for lab work.  She went to the dietitian for the fatty liver.  She was given some information what to eat and not to eat for fatty liver.  Her insurance does not cover follow-up for dietitian but she was given patient information for her fatty liver and GERD and she is reduced tour spicy food and tortilla and not having any fried food and decrease Mexican food.  She is having more quinoa and brown rice.  She is having more vegetables and salads.  She did do her mammogram which showed breast having scattered areas of density but no issues of malignancy and they recommend screening mammogram in 1 year.  She needs her Tdap vaccine and she will come back for her well woman exam where I will do her breast exam and Pap exam and she will get her lab work done before then so we can go over it.  Left ventricle showing good ejection fraction at 65%.  There is trace mitral regurg.  There is trace tricuspid regurg.  She has not got her blood pressure done yet.  She had acid reflux for about 10 years.  In 2006 she went to see a GI doctor who put her on  "omeprazole and they did not do a scope as omeprazole helped.  SHe has been on omeprazole on and off for about 5 years.  Is always been on omeprazole 20 mg.    Review of systems:     Constitutional: Negative for fever, chills and positive fatigue.   HENT: Negative for sinus pressure,  Eyes: Negative for blurriness, negative for double vision, positive nasal allergies and congestion.  Respiratory: Negative for cough and shortness of breath, negative for exertional shortness of breath  Cardiovascular: Negative for leg swelling, negative for palpitations, negative for chest pain  Gastrointestinal: Negative for nausea, vomiting, abdominal pain, constipation and diarrhea.  Positive heartburn.  Genitourinary: Negative for dysuria and hematuria.   Skin: Negative for rash.   Neurological: Negative for dizziness, focal weakness and headaches.   Endo/Heme/Allergies: Denies bleeding, bruising, and recurrent allergies.  Psychiatric/Behavioral: Negative for depression and anxiety.        Current Outpatient Prescriptions:   •  montelukast (SINGULAIR) 10 MG Tab, Take 1 Tab by mouth every day., Disp: 30 Tab, Rfl: 3  •  fluticasone (FLONASE) 50 MCG/ACT nasal spray, , Disp: , Rfl: 0  •  omeprazole (PRILOSEC) 20 MG delayed-release capsule, Take 1 Cap by mouth every day., Disp: 30 Cap, Rfl: 1  •  Omega-3 Fatty Acids (FISH OIL) 1000 MG Cap capsule, Take 1,000 mg by mouth 3 times a day, with meals., Disp: , Rfl:   •  ascorbic acid (ASCORBIC ACID) 500 MG Tab, Take 500 mg by mouth every day., Disp: , Rfl:     Allergies, past medical history, past surgical history, family history, social history reviewed and updated    Objective:     Vitals: /78 (BP Location: Left arm, Patient Position: Sitting, BP Cuff Size: Adult)   Pulse 71   Temp 36.8 °C (98.2 °F) (Temporal)   Resp 12   Ht 1.575 m (5' 2\")   Wt 69.8 kg (153 lb 12.8 oz)   LMP 04/25/2019 (Approximate)   SpO2 98%   Breastfeeding? No   BMI 28.13 kg/m²   General: Alert, " pleasant, NAD  HEENT: Normocephalic.  Nontraumatic. EOMI, no icterus or pallor.  Conjunctivae and lids normal. External ears normal.   Heart: Regular rate and rhythm.  S1 and S2 normal.  No murmurs appreciated.  Respiratory: Normal respiratory effort.  Clear to auscultation bilaterally.  Skin: Warm, dry, no rashes.  Musculoskeletal: Gait is normal.  Moves all extremities well.  Extremities: No leg edema.     Psych:  Affect/mood is normal, judgement is good, memory is intact, grooming is appropriate.    Assessment/Plan:     Diagnoses and all orders for this visit:    Seasonal allergic rhinitis due to pollen  -     montelukast (SINGULAIR) 10 MG Tab; Take 1 Tab by mouth every day.    Dense breast tissue on mammogram    Need for vaccination  -     Tdap Vaccine =>8YO IM    Gastroesophageal reflux disease without esophagitis    BMI 28.0-28.9,adult    Cardiac murmur    Fatty liver    - She did do her mammogram which showed breast having scattered areas of density but no issues of malignancy and they recommend screening mammogram in 1 year.  Went over echo left ventricle showing good ejection fraction at 65%.  There is trace mitral regurg.  There is trace tricuspid regurg.  Hoarseness proved as above and will follow up with ear nose and throat if she wants to do nasal septum surgery but she wants to hold for now.  Also continue to see the eye doctor to see for her right eye if there are any cataracts and if she needs to get referral for ophthalmology.  Continue omeprazole 20 mg daily in the morning.  Will follow patient instruction sheet to avoid food for fatty liver and also for acid reflux.  Has seen GI in the past and if GERD is still an issue will refer to GI for endoscopy but she wants to hold for now.  Still getting some snoring and will hold on the nasal septum surgery or sleep study for now.  Tdap vaccine given.  For her allergies will continue Flonase in the morning and over-the-counter saline wrist rinses or  [place] pot in both nostrils twice a day and do montelukast Singulair 10 mg daily for prevention at nighttime for allergies.  No breast concerns right now and will just do annual mammogram and will do her breast clinical exam at her well woman exam at her next follow-up appointment and please get fasting lab work 8 hours of no eating but drink plenty of water at least 2 weeks before your 6-week follow-up for your well woman exam.  Also please check your blood pressure check to see if there is a cough at [facility] or any other pharmacies and record the top number systolic and the bottom number diastolic and heart rate in both arms sometimes in the morning, afternoon and evening 3 times a week and record this on a piece of paper and bring this in to your follow-up at your next visit.  ER precautions given if any chest pain, shortness of breath, passing out then please call 911 or go to the ER.    Return in about 6 weeks (around 6/12/2019), or BP/labs/WWE/Pap, for Long, 40 min appnt, Well Women Check with Pap.

## 2019-04-30 NOTE — PATIENT INSTRUCTIONS
Diagnoses and all orders for this visit:    Seasonal allergic rhinitis due to pollen  -     montelukast (SINGULAIR) 10 MG Tab; Take 1 Tab by mouth every day.    Dense breast tissue on mammogram    Need for vaccination  -     Tdap Vaccine =>4YO IM    Gastroesophageal reflux disease without esophagitis    BMI 28.0-28.9,adult    Cardiac murmur    Fatty liver    - She did do her mammogram which showed breast having scattered areas of density but no issues of malignancy and they recommend screening mammogram in 1 year.  Went over echo left ventricle showing good ejection fraction at 65%.  There is trace mitral regurg.  There is trace tricuspid regurg.  Hoarseness proved as above and will follow up with ear nose and throat if she wants to do nasal septum surgery but she wants to hold for now.  Also continue to see the eye doctor to see for her right eye if there are any cataracts and if she needs to get referral for ophthalmology.  Continue omeprazole 20 mg daily in the morning.  Will follow patient instruction sheet to avoid food for fatty liver and also for acid reflux.  Has seen GI in the past and if GERD is still an issue will refer to GI for endoscopy but she wants to hold for now.  Still getting some snoring and will hold on the nasal septum surgery or sleep study for now.  Tdap vaccine given.  For her allergies will continue Flonase in the morning and over-the-counter saline wrist rinses or Sakina pot in both nostrils twice a day and do montelukast Singulair 10 mg daily for prevention at nighttime for allergies.  No breast concerns right now and will just do annual mammogram and will do her breast clinical exam at her well woman exam at her next follow-up appointment and please get fasting lab work 8 hours of no eating but drink plenty of water at least 2 weeks before your 6-week follow-up for your well woman exam.  Also please check your blood pressure check to see if there is a cough at [facility] or any other  pharmacies and record the top number systolic and the bottom number diastolic and heart rate in both arms sometimes in the morning, afternoon and evening 3 times a week and record this on a piece of paper and bring this in to your follow-up at your next visit.  ER precautions given if any chest pain, shortness of breath, passing out then please call 911 or go to the ER.    Return in about 6 weeks (around 6/12/2019), or BP/labs/WWE/Pap, for Long, 40 min appnt, Well Women Check with Pap.    Fatty Liver  Introduction  Fatty liver, also called hepatic steatosis or steatohepatitis, is a condition in which too much fat has built up in your liver cells. The liver removes harmful substances from your bloodstream. It produces fluids your body needs. It also helps your body use and store energy from the food you eat.  In many cases, fatty liver does not cause symptoms or problems. It is often diagnosed when tests are being done for other reasons. However, over time, fatty liver can cause inflammation that may lead to more serious liver problems, such as scarring of the liver (cirrhosis).  What are the causes?  Causes of fatty liver may include:  · Drinking too much alcohol.  · Poor nutrition.  · Obesity.  · Cushing syndrome.  · Diabetes.  · Hyperlipidemia.  · Pregnancy.  · Certain drugs.  · Poisons.  · Some viral infections.  What increases the risk?  You may be more likely to develop fatty liver if you:  · Abuse alcohol.  · Are pregnant.  · Are overweight.  · Have diabetes.  · Have hepatitis.  · Have a high triglyceride level.  What are the signs or symptoms?  Fatty liver often does not cause any symptoms. In cases where symptoms develop, they can include:  · Fatigue.  · Weakness.  · Weight loss.  · Confusion.  · Abdominal pain.  · Yellowing of your skin and the white parts of your eyes (jaundice).  · Nausea and vomiting.  How is this diagnosed?  Fatty liver may be diagnosed by:  · Physical exam and medical history.  · Blood  tests.  · Imaging tests, such as an ultrasound, CT scan, or MRI.  · Liver biopsy. A small sample of liver tissue is removed using a needle. The sample is then looked at under a microscope.  How is this treated?  Fatty liver is often caused by other health conditions. Treatment for fatty liver may involve medicines and lifestyle changes to manage conditions such as:  · Alcoholism.  · High cholesterol.  · Diabetes.  · Being overweight or obese.  Follow these instructions at home:  · Eat a healthy diet as directed by your health care provider.  · Exercise regularly. This can help you lose weight and control your cholesterol and diabetes. Talk to your health care provider about an exercise plan and which activities are best for you.  · Do not drink alcohol.  · Take medicines only as directed by your health care provider.  Contact a health care provider if:  You have difficulty controlling your:  · Blood sugar.  · Cholesterol.  · Alcohol consumption.  Get help right away if:  · You have abdominal pain.  · You have jaundice.  · You have nausea and vomiting.  This information is not intended to replace advice given to you by your health care provider. Make sure you discuss any questions you have with your health care provider.    Nonalcoholic Fatty Liver Disease Diet  Introduction  Nonalcoholic fatty liver disease is a condition that causes fat to accumulate in and around the liver. The disease makes it harder for the liver to work the way that it should. Following a healthy diet can help to keep nonalcoholic fatty liver disease under control. It can also help to prevent or improve conditions that are associated with the disease, such as heart disease, diabetes, high blood pressure, and abnormal cholesterol levels. Along with regular exercise, this diet:  · Promotes weight loss.  · Helps to control blood sugar levels.  · Helps to improve the way that the body uses insulin.  What do I need to know about this diet?  · Use  the glycemic index (GI) to plan your meals. The index tells you how quickly a food will raise your blood sugar. Choose low-GI foods. These foods take a longer time to raise blood sugar.  · Keep track of how many calories you take in. Eating the right amount of calories will help you to achieve a healthy weight.  · You may want to follow a Mediterranean diet. This diet includes a lot of vegetables, lean meats or fish, whole grains, fruits, and healthy oils and fats.  What foods can I eat?  Grains   Whole grains, such as whole-wheat or whole-grain breads, crackers, tortillas, cereals, and pasta. Stone-ground whole wheat. Pumpernickel bread. Unsweetened oatmeal. Bulgur. Barley. Quinoa. Brown or wild rice. Corn or whole-wheat flour tortillas.  Vegetables   Lettuce. Spinach. Peas. Beets. Cauliflower. Cabbage. Broccoli. Carrots. Tomatoes. Squash. Eggplant. Herbs. Peppers. Onions. Cucumbers. [place] sprouts. Yams and sweet potatoes. Beans. Lentils.  Fruits   Bananas. Apples. Oranges. Grapes. Papaya. Tra. Pomegranate. Kiwi. Grapefruit. Cherries.  Meats and Other Protein Sources   Seafood and shellfish. Lean meats. Poultry. Tofu.  Dairy   Low-fat or fat-free dairy products, such as yogurt, cottage cheese, and cheese.  Beverages   Water. Sugar-free drinks. Tea. Coffee. Low-fat or skim milk. Milk alternatives, such as soy or almond milk. Real fruit juice.  Condiments   Mustard. Relish. Low-fat, low-sugar ketchup and barbecue sauce. Low-fat or fat-free mayonnaise.  Sweets and Desserts   Sugar-free sweets.  Fats and Oils   Avocado. Canola or olive oil. Nuts and nut butters. Seeds.  The items listed above may not be a complete list of recommended foods or beverages. Contact your dietitian for more options.   What foods are not recommended?  Palm oil and coconut oil. Processed foods. Fried foods. Sweetened drinks, such as sweet tea, milkshakes, snow cones, iced sweet drinks, and sodas. Alcohol. Sweets. Foods that contain a lot  of salt or sodium.  The items listed above may not be a complete list of foods and beverages to avoid. Contact your dietitian for more information.   This information is not intended to replace advice given to you by your health care provider. Make sure you discuss any questions you have with your health care provider.    Food Choices for Gastroesophageal Reflux Disease, Adult  When you have gastroesophageal reflux disease (GERD), the foods you eat and your eating habits are very important. Choosing the right foods can help ease the discomfort of GERD.  WHAT GENERAL GUIDELINES DO I NEED TO FOLLOW?  · Choose fruits, vegetables, whole grains, low-fat dairy products, and low-fat meat, fish, and poultry.  · Limit fats such as oils, salad dressings, butter, nuts, and avocado.  · Keep a food diary to identify foods that cause symptoms.  · Avoid foods that cause reflux. These may be different for different people.  · Eat frequent small meals instead of three large meals each day.  · Eat your meals slowly, in a relaxed setting.  · Limit fried foods.  · Cook foods using methods other than frying.  · Avoid drinking alcohol.  · Avoid drinking large amounts of liquids with your meals.  · Avoid bending over or lying down until 2-3 hours after eating.  WHAT FOODS ARE NOT RECOMMENDED?  The following are some foods and drinks that may worsen your symptoms:  Vegetables  Tomatoes. Tomato juice. Tomato and spaghetti sauce. Chili peppers. Onion and garlic. Horseradish.  Fruits  Oranges, grapefruit, and lemon (fruit and juice).  Meats  High-fat meats, fish, and poultry. This includes hot dogs, ribs, ham, sausage, salami, and izaguirre.  Dairy  Whole milk and chocolate milk. Sour cream. Cream. Butter. Ice cream. Cream cheese.   Beverages  Coffee and tea, with or without caffeine. Carbonated beverages or energy drinks.  Condiments  Hot sauce. Barbecue sauce.   Sweets/Desserts  Chocolate and cocoa. Donuts. Peppermint and spearmint.  Fats and  Oils  High-fat foods, including French fries and potato chips.  Other  Vinegar. Strong spices, such as black pepper, white pepper, red pepper, cayenne, perez powder, cloves, ginger, and chili powder.  The items listed above may not be a complete list of foods and beverages to avoid. Contact your dietitian for more information.     This information is not intended to replace advice given to you by your health care provider. Make sure you discuss any questions you have with your health care provider.       Heartburn  Heartburn is a type of pain or discomfort that can happen in the throat or chest. It is often described as a burning pain. It may also cause a bad taste in the mouth. Heartburn may feel worse when you lie down or bend over, and it is often worse at night. Heartburn may be caused by stomach contents that move back up into the esophagus (reflux).  Follow these instructions at home:  Take these actions to decrease your discomfort and to help avoid complications.  Diet  · Follow a diet as recommended by your health care provider. This may involve avoiding foods and drinks such as:  ¨ Coffee and tea (with or without caffeine).  ¨ Drinks that contain alcohol.  ¨ Energy drinks and sports drinks.  ¨ Carbonated drinks or sodas.  ¨ Chocolate and cocoa.  ¨ Peppermint and mint flavorings.  ¨ Garlic and onions.  ¨ Horseradish.  ¨ Spicy and acidic foods, including peppers, chili powder, perez powder, vinegar, hot sauces, and barbecue sauce.  ¨ Citrus fruit juices and citrus fruits, such as oranges, phuc, and limes.  ¨ Tomato-based foods, such as red sauce, chili, salsa, and pizza with red sauce.  ¨ Fried and fatty foods, such as donuts, french fries, potato chips, and high-fat dressings.  ¨ High-fat meats, such as hot dogs and fatty cuts of red and white meats, such as rib eye steak, sausage, ham, and izaguirre.  ¨ High-fat dairy items, such as whole milk, butter, and cream cheese.  · Eat small, frequent meals instead of  large meals.  · Avoid drinking large amounts of liquid with your meals.  · Avoid eating meals during the 2-3 hours before bedtime.  · Avoid lying down right after you eat.  · Do not exercise right after you eat.  General instructions  · Pay attention to any changes in your symptoms.  · Take over-the-counter and prescription medicines only as told by your health care provider. Do not take aspirin, ibuprofen, or other NSAIDs unless your health care provider told you to do so.  · Do not use any tobacco products, including cigarettes, chewing tobacco, and e-cigarettes. If you need help quitting, ask your health care provider.  · Wear loose-fitting clothing. Do not wear anything tight around your waist that causes pressure on your abdomen.  · Raise (elevate) the head of your bed about 6 inches (15 cm).  · Try to reduce your stress, such as with yoga or meditation. If you need help reducing stress, ask your health care provider.  · If you are overweight, reduce your weight to an amount that is healthy for you. Ask your health care provider for guidance about a safe weight loss goal.  · Keep all follow-up visits as told by your health care provider. This is important.  Contact a health care provider if:  · You have new symptoms.  · You have unexplained weight loss.  · You have difficulty swallowing, or it hurts to swallow.  · You have wheezing or a persistent cough.  · Your symptoms do not improve with treatment.  · You have frequent heartburn for more than two weeks.  Get help right away if:  · You have pain in your arms, neck, jaw, teeth, or back.  · You feel sweaty, dizzy, or light-headed.  · You have chest pain or shortness of breath.  · You vomit and your vomit looks like blood or coffee grounds.  · Your stool is bloody or black.  This information is not intended to replace advice given to you by your health care provider. Make sure you discuss any questions you have with your health care provider.      How to Take  Your Blood Pressure  Blood pressure is a measurement of how strongly your blood is pressing against the walls of your arteries. Arteries are blood vessels that carry blood from your heart throughout your body. Your health care provider takes your blood pressure at each office visit. You can also take your own blood pressure at home with a blood pressure machine. You may need to take your own blood pressure:  · To confirm a diagnosis of high blood pressure (hypertension).  · To monitor your blood pressure over time.  · To make sure your blood pressure medicine is working.  Supplies needed:  To take your blood pressure, you will need a blood pressure machine. You can buy a blood pressure machine, or blood pressure monitor, at most [employer] or online. There are several types of home blood pressure monitors. When choosing one, consider the following:  · Choose a monitor that has an arm cuff.  · Choose a monitor that wraps snugly around your upper arm. You should be able to fit only one finger between your arm and the cuff.  · Do not choose a monitor that measures your blood pressure from your wrist or finger.  Your health care provider can suggest a reliable monitor that will meet your needs.  How to prepare  To get the most accurate reading, avoid the following for 30 minutes before you check your blood pressure:  · Drinking caffeine.  · Drinking alcohol.  · Eating.  · Smoking.  · Exercising.  Five minutes before you check your blood pressure:  · Empty your bladder.  · Sit quietly without talking in a dining chair, rather than in a soft couch or armchair.  How to take your blood pressure  To check your blood pressure, follow the instructions in the manual that came with your blood pressure monitor. If you have a digital blood pressure monitor, the instructions may be as follows:  1. Sit up straight.  2. Place your feet on the floor. Do not cross your ankles or legs.  3. Rest your left arm at the level of your heart  "on a table or desk or on the arm of a chair.  4. Pull up your shirt sleeve.  5. Wrap the blood pressure cuff around the upper part of your left arm, 1 inch (2.5 cm) above your elbow. It is best to wrap the cuff around bare skin.  6. Fit the cuff snugly around your arm. You should be able to place only one finger between the cuff and your arm.  7. Position the cord inside the groove of your elbow.  8. Press the power button.  9. Sit quietly while the cuff inflates and deflates.  10. Read the digital reading on the monitor screen and write it down (record it).  11. Wait 2-3 minutes, then repeat the steps starting at step 1.  What does my blood pressure reading mean?  A blood pressure reading is recorded as two numbers, such as \"120 over 80\" (or 120/80). The first (\"top\") number is called the systolic pressure. It is a measure of the pressure in your arteries as the heart beats. The second (\"bottom\") number is called the diastolic pressure. It is a measure of the pressure in your arteries as the heart relaxes between beats.  Blood pressure is classified into four stages. The following are the stages for adults who do not have a short-term serious illness or a chronic condition. Systolic pressure and diastolic pressure are measured in a unit called mm Hg.  Normal  · Systolic pressure: below 120.  · Diastolic pressure: below 80.  Prehypertension  · Systolic pressure: 120-139.  · Diastolic pressure: 80-89.  Hypertension stage 1  · Systolic pressure: 140-159.  · Diastolic pressure: 90-99.  Hypertension stage 2  · Systolic pressure: 160 or above.  · Diastolic pressure: 100 or above.  You can have prehypertension or hypertension even if only the systolic or only the diastolic number in your reading is higher than normal.  Follow these instructions at home:  · Check your blood pressure as often as recommended by your health care provider.  · Take your monitor to the next appointment with your health care provider to make " sure:  ¨ That you are using it correctly.  ¨ That it provides accurate readings.  · Be sure you understand what your goal blood pressure numbers are.  · Tell your health care provider if you are having any side effects from blood pressure medicine.  Contact a health care provider if:  · Your blood pressure is consistently high.  Get help right away if:  · Your systolic blood pressure is higher than 180.  · Your diastolic blood pressure is higher than 110.  This information is not intended to replace advice given to you by your health care provider. Make sure you discuss any questions you have with your health care provider.    Introduction  Your blood pressure on this visit to the emergency department or clinic is elevated. This does not necessarily mean you have high blood pressure (hypertension), but it does mean that your blood pressure needs to be rechecked. Many times your blood pressure can increase due to illness, pain, anxiety, or other factors.  We recommend that you get a series of blood pressure readings done over a period of 5 days. It is best to get a reading in the morning and one in the evening. You should make sure to sit and relax for 1-5 minutes before the reading is taken. Write the readings down and make a follow-up appointment with your health care provider to discuss the results. If there is not a free clinic or a drug store with a blood-pressure-taking machine near you, you can purchase blood-pressure-taking equipment from a drug store. Having one in the home allows you the convenience of taking your blood pressure while you are home and relaxed.  BLOOD PRESSURE LOG   Date: _______________________  · a.m. _____________________  · p.m. _____________________  Date: _______________________  · a.m. _____________________  · p.m. _____________________  Date: _______________________  · a.m. _____________________  · p.m. _____________________  Date: _______________________  · a.m.  _____________________  · p.m. _____________________  Date: _______________________  · a.m. _____________________  · p.m. _____________________  This information is not intended to replace advice given to you by your health care provider. Make sure you discuss any questions you have with your health care provider.

## 2019-05-02 ENCOUNTER — APPOINTMENT (OUTPATIENT)
Dept: HEALTH INFORMATION MANAGEMENT | Facility: MEDICAL CENTER | Age: 39
End: 2019-05-02
Payer: COMMERCIAL

## 2019-06-04 LAB
25(OH)D3+25(OH)D2 SERPL-MCNC: 17.1 NG/ML (ref 30–100)
ALBUMIN SERPL-MCNC: 4.3 G/DL (ref 3.5–5.5)
ALBUMIN/GLOB SERPL: 1.6 {RATIO} (ref 1.2–2.2)
ALP SERPL-CCNC: 73 IU/L (ref 39–117)
ALT SERPL-CCNC: 28 IU/L (ref 0–32)
AST SERPL-CCNC: 17 IU/L (ref 0–40)
BASOPHILS # BLD AUTO: 0 X10E3/UL (ref 0–0.2)
BASOPHILS NFR BLD AUTO: 0 %
BILIRUB SERPL-MCNC: 0.3 MG/DL (ref 0–1.2)
BUN SERPL-MCNC: 11 MG/DL (ref 6–24)
BUN/CREAT SERPL: 17 (ref 9–23)
C TRACH RRNA SPEC QL NAA+PROBE: NEGATIVE
CALCIUM SERPL-MCNC: 9.4 MG/DL (ref 8.7–10.2)
CHLORIDE SERPL-SCNC: 106 MMOL/L (ref 96–106)
CHOLEST SERPL-MCNC: 192 MG/DL (ref 100–199)
CO2 SERPL-SCNC: 20 MMOL/L (ref 20–29)
CREAT SERPL-MCNC: 0.66 MG/DL (ref 0.57–1)
EOSINOPHIL # BLD AUTO: 0.2 X10E3/UL (ref 0–0.4)
EOSINOPHIL NFR BLD AUTO: 2 %
ERYTHROCYTE [DISTWIDTH] IN BLOOD BY AUTOMATED COUNT: 12.9 % (ref 12.3–15.4)
GLOBULIN SER CALC-MCNC: 2.7 G/DL (ref 1.5–4.5)
GLUCOSE SERPL-MCNC: 102 MG/DL (ref 65–99)
HAV IGM SERPL QL IA: NEGATIVE
HBA1C MFR BLD: 5.9 % (ref 4.8–5.6)
HBV CORE IGM SERPL QL IA: NEGATIVE
HBV SURFACE AG SERPL QL IA: NEGATIVE
HCT VFR BLD AUTO: 41.4 % (ref 34–46.6)
HCV AB S/CO SERPL IA: <0.1 S/CO RATIO (ref 0–0.9)
HDLC SERPL-MCNC: 44 MG/DL
HGB BLD-MCNC: 14 G/DL (ref 11.1–15.9)
HIV 1+2 AB+HIV1 P24 AG SERPL QL IA: NON REACTIVE
IMM GRANULOCYTES # BLD AUTO: 0 X10E3/UL (ref 0–0.1)
IMM GRANULOCYTES NFR BLD AUTO: 0 %
IMMATURE CELLS  115398: NORMAL
LABORATORY COMMENT REPORT: ABNORMAL
LDLC SERPL CALC-MCNC: 112 MG/DL (ref 0–99)
LYMPHOCYTES # BLD AUTO: 2.8 X10E3/UL (ref 0.7–3.1)
LYMPHOCYTES NFR BLD AUTO: 41 %
MCH RBC QN AUTO: 28.7 PG (ref 26.6–33)
MCHC RBC AUTO-ENTMCNC: 33.8 G/DL (ref 31.5–35.7)
MCV RBC AUTO: 85 FL (ref 79–97)
MONOCYTES # BLD AUTO: 0.4 X10E3/UL (ref 0.1–0.9)
MONOCYTES NFR BLD AUTO: 6 %
MORPHOLOGY BLD-IMP: NORMAL
N GONORRHOEA RRNA SPEC QL NAA+PROBE: NEGATIVE
NEUTROPHILS # BLD AUTO: 3.4 X10E3/UL (ref 1.4–7)
NEUTROPHILS NFR BLD AUTO: 51 %
NRBC BLD AUTO-RTO: NORMAL %
PLATELET # BLD AUTO: 260 X10E3/UL (ref 150–450)
POTASSIUM SERPL-SCNC: 4.3 MMOL/L (ref 3.5–5.2)
PROT SERPL-MCNC: 7 G/DL (ref 6–8.5)
RBC # BLD AUTO: 4.88 X10E6/UL (ref 3.77–5.28)
RPR SER QL: NON REACTIVE
SODIUM SERPL-SCNC: 141 MMOL/L (ref 134–144)
TRIGL SERPL-MCNC: 180 MG/DL (ref 0–149)
TSH SERPL DL<=0.005 MIU/L-ACNC: 2.35 UIU/ML (ref 0.45–4.5)
VIT B12 SERPL-MCNC: 972 PG/ML (ref 232–1245)
VLDLC SERPL CALC-MCNC: 36 MG/DL (ref 5–40)
WBC # BLD AUTO: 6.7 X10E3/UL (ref 3.4–10.8)

## 2019-06-12 ENCOUNTER — HOSPITAL ENCOUNTER (OUTPATIENT)
Facility: MEDICAL CENTER | Age: 39
End: 2019-06-12
Attending: FAMILY MEDICINE
Payer: COMMERCIAL

## 2019-06-12 ENCOUNTER — OFFICE VISIT (OUTPATIENT)
Dept: MEDICAL GROUP | Facility: PHYSICIAN GROUP | Age: 39
End: 2019-06-12
Payer: COMMERCIAL

## 2019-06-12 VITALS
SYSTOLIC BLOOD PRESSURE: 114 MMHG | HEIGHT: 62 IN | TEMPERATURE: 98 F | BODY MASS INDEX: 28.08 KG/M2 | DIASTOLIC BLOOD PRESSURE: 82 MMHG | HEART RATE: 68 BPM | RESPIRATION RATE: 12 BRPM | OXYGEN SATURATION: 95 % | WEIGHT: 152.6 LBS

## 2019-06-12 DIAGNOSIS — Z51.81 MEDICATION MONITORING ENCOUNTER: ICD-10-CM

## 2019-06-12 DIAGNOSIS — Z01.419 WELL FEMALE EXAM WITH ROUTINE GYNECOLOGICAL EXAM: ICD-10-CM

## 2019-06-12 DIAGNOSIS — J30.1 SEASONAL ALLERGIC RHINITIS DUE TO POLLEN: ICD-10-CM

## 2019-06-12 DIAGNOSIS — Z12.4 SCREENING FOR CERVICAL CANCER: ICD-10-CM

## 2019-06-12 DIAGNOSIS — K21.9 GASTROESOPHAGEAL REFLUX DISEASE WITHOUT ESOPHAGITIS: ICD-10-CM

## 2019-06-12 DIAGNOSIS — N89.8 VAGINAL DISCHARGE: ICD-10-CM

## 2019-06-12 DIAGNOSIS — R73.03 PREDIABETES: ICD-10-CM

## 2019-06-12 DIAGNOSIS — E78.5 HYPERLIPIDEMIA, UNSPECIFIED HYPERLIPIDEMIA TYPE: ICD-10-CM

## 2019-06-12 DIAGNOSIS — E55.9 VITAMIN D DEFICIENCY: ICD-10-CM

## 2019-06-12 DIAGNOSIS — R73.9 HYPERGLYCEMIA: ICD-10-CM

## 2019-06-12 DIAGNOSIS — G47.30 SLEEP APNEA, UNSPECIFIED TYPE: ICD-10-CM

## 2019-06-12 PROCEDURE — 99396 PREV VISIT EST AGE 40-64: CPT | Performed by: FAMILY MEDICINE

## 2019-06-12 PROCEDURE — 87510 GARDNER VAG DNA DIR PROBE: CPT

## 2019-06-12 PROCEDURE — 87624 HPV HI-RISK TYP POOLED RSLT: CPT

## 2019-06-12 PROCEDURE — 88175 CYTOPATH C/V AUTO FLUID REDO: CPT

## 2019-06-12 PROCEDURE — 87480 CANDIDA DNA DIR PROBE: CPT

## 2019-06-12 PROCEDURE — 87660 TRICHOMONAS VAGIN DIR PROBE: CPT

## 2019-06-12 RX ORDER — ERGOCALCIFEROL 1.25 MG/1
CAPSULE ORAL
Qty: 12 CAP | Refills: 1 | Status: SHIPPED | OUTPATIENT
Start: 2019-06-12 | End: 2020-01-21

## 2019-06-12 RX ORDER — OMEPRAZOLE 20 MG/1
20 CAPSULE, DELAYED RELEASE ORAL DAILY
Qty: 90 CAP | Refills: 1 | Status: SHIPPED | OUTPATIENT
Start: 2019-06-12 | End: 2019-10-07 | Stop reason: SDUPTHER

## 2019-06-12 RX ORDER — MONTELUKAST SODIUM 10 MG/1
10 TABLET ORAL DAILY
Qty: 90 TAB | Refills: 1 | Status: SHIPPED | OUTPATIENT
Start: 2019-06-12 | End: 2019-10-07 | Stop reason: SDUPTHER

## 2019-06-12 RX ORDER — FLUTICASONE PROPIONATE 50 MCG
1 SPRAY, SUSPENSION (ML) NASAL DAILY
Qty: 16 G | Refills: 0 | Status: SHIPPED | OUTPATIENT
Start: 2019-06-12 | End: 2019-10-07 | Stop reason: SDUPTHER

## 2019-06-12 NOTE — PATIENT INSTRUCTIONS
Diagnoses and all orders for this visit:    Well female exam with routine gynecological exam  -     THINPREP PAP WITH HPV; Future    Screening for cervical cancer  -     THINPREP PAP WITH HPV; Future    Seasonal allergic rhinitis due to pollen  -     fluticasone (FLONASE) 50 MCG/ACT nasal spray; Spray 1 Spray in nose every day.  -     montelukast (SINGULAIR) 10 MG Tab; Take 1 Tab by mouth every day.    Gastroesophageal reflux disease without esophagitis  -     omeprazole (PRILOSEC) 20 MG delayed-release capsule; Take 1 Cap by mouth every day.    Vitamin D deficiency  -     ergocalciferol (DRISDOL) 78295 UNIT capsule; Take one tab po once a week.    Hyperlipidemia, unspecified hyperlipidemia type    Prediabetes    BMI 27.0-27.9,adult    Medication monitoring encounter    Hyperglycemia    Vaginal discharge  -     VAGINAL PATHOGENS DNA PANEL; Future    Sleep apnea, unspecified type  -     REFERRAL TO SLEEP STUDIES    - Complete blood count within normal limits, complete metabolic panel within normal limits with fasting glucose at 102, A1c 5.9, TSH thyroid within normal limits, lipid panel with LDL mildly high at 112, HDL 44, triglycerides high at 180, and total cholesterol of 192.  Vitamin D low at 17.1, vitamin B12 normal at 972, RPR negative, HIV negative and chlamydia and gonorrhea negative.  -Went over lab work as above and will start high-dose vitamin D 50,000 units once a week for 3 to 6 months.  We will also look at patient instruction sheet and work on high cholesterol with a fat restricted diet and look at what prediabetes is and how to prevent diabetes and we will recheck her A1c in 3 months and she will work on her diet and can do omega-3 fatty acid fish oil twice a day with meals and red yeast over-the-counter twice a day with meals for cholesterol and also can do lose it, my fitness tracker or weight watchers jyoti to try to lose about 5 pounds a month for a total of 15 to 20 pounds.  We will recheck her  weight in 3 months and see how this does.  We will also send referral for sleep study as she is never had one for concern for sleep apnea.  STD screening negative and mammogram not due for another year.  Pap test and vaginal wet prep swab done.  For allergies will continue montelukast daily at night and Flonase spray in the morning and can do saline ounces or [place] for allergies which is stable now.  She will also continue for her acid reflux omeprazole daily in the morning and avoid foods that can increase her heartburn.  We will see her back in about 3 months and get her POC A1c at this appointment.    Return in about 3 months (around 9/12/2019), or Prediabetes/Sleep/AIC.    Red Yeast Rice capsules  What is this medicine?  RED YEAST RICE (red yeest rahys) is intended to be used by healthy adults to help lower blood cholesterol in conjunction with a healthy diet and a regular exercise program. The FDA has not approved this supplement for any medical use. If medical treatment is needed for cholesterol control or any other disease, you should contact your doctor or health care professional regarding the use of this product.  This supplement may be used for other purposes; ask your health care provider or pharmacist if you have questions.  This medicine may be used for other purposes; ask your health care provider or pharmacist if you have questions.  COMMON BRAND NAME(S): Red Yeast Rice  What should I tell my health care provider before I take this medicine?  They need to know if you have any of these conditions:  -frequently drink alcoholic beverages  -kidney disease  -liver disease  -muscle aches or weakness  -other medical condition  -an unusual or allergic reaction to red yeast rice, went yeast, lovastatin, other 'statin' medications, other medicines, foods, dyes, or preservatives  -pregnant or trying to get pregnant  -breast-feeding  How should I use this medicine?  Take this supplement by mouth with a  glass of water. Follow the directions on the package labeling, or take as directed by your health care professional. Do not take this supplement more often than directed.  Contact your pediatrician or health care professional regarding the use of this supplement in children. Special care may be needed. This supplement is not recommended for use in children.  Overdosage: If you think you have taken too much of this medicine contact a poison control center or emergency room at once.  NOTE: This medicine is only for you. Do not share this medicine with others.  What if I miss a dose?  If you miss a dose, take it as soon as you can. If it is almost time for your next dose, take only that dose. Do not take double or extra doses.  What may interact with this medicine?  Do not take this medicine with any of the following medications:  -clarithromycin  -delavirdine  -erythromycin  -grapefruit juice  -protease inhibitors used to treat HIV infection  -medicines for fungal infections like itraconazole, ketoconazole, posaconazole, and voriconazole  -mibefradil  -nefazodone  -other medicines for high cholesterol  -telithromycin  -troleandomycin  This medicine may also interact with the following medications:  -alcohol  -amiodarone  -colchicine  -cyclosporine  -danazol  -diltiazem  -fenofibrate  -fluconazole  -gemfibrozil  -mifepristone, RU-486  -niacin  -Hunter's wort  -verapamil  -voriconazole  -warfarin  This list may not describe all possible interactions. Give your health care provider a list of all the medicines, herbs, non-prescription drugs, or dietary supplements you use. Also tell them if you smoke, drink alcohol, or use illegal drugs. Some items may interact with your medicine.  What should I watch for while using this medicine?  Visit your doctor or health care professional for regular check-ups. You may need regular tests to make sure your liver is working properly.  Tell you doctor or health care professional  right away if you get any unexplained muscle pain, tenderness, or weakness, especially if you also have a fever and tiredness.  Some drugs may increase the risk of side effects from this supplement. If you are given certain antibiotics or antifungals, you should stop taking this supplement during those treatments. Check with your doctor or pharmacist for advice.  If you are scheduled for any medical or dental procedure, tell your healthcare provider that you are taking this supplement. You may need to stop taking this supplement before the procedure.  Do not use this drug if you are pregnant or breast-feeding. Serious side effects to an unborn child or to an infant are possible. Talk to your doctor or pharmacist for more information.  Herbal or dietary supplements are not regulated like medicines. Rigid  standards are not required for dietary supplements. The purity and strength of these products can vary. The safety and effect of this dietary supplement for a certain disease or illness is not well known. This product is not intended to diagnose, treat, cure or prevent any disease.  The Food and Drug Administration suggests the following to help consumers protect themselves:  -Always read product labels and follow directions.  -Natural does not mean a product is safe for humans to take.  -Look for products that include USP after the ingredient name. This means that the  followed the standards of the [place] Pharmacopoeia.  -Supplements made or sold by a nationally known food or drug company are more likely to be made under tight controls. You can write to the company for more information about how the product was made.  What side effects may I notice from receiving this medicine?  Side effects that you should report to your doctor or health care professional as soon as possible:  -allergic reactions like skin rash, itching or hives, swelling of the face, lips, or tongue  -dark  urine  -fever  -joint pain  -muscle cramps, pain  -redness, blistering, peeling or loosening of the skin, including inside the mouth  -trouble passing urine or change in the amount of urine  -unusually weak or tired  -yellowing of the eyes or skin  Side effects that usually do not require medical attention (report to your doctor or health care professional if they continue or are bothersome):  -constipation  -headache  -stomach gas, pain, upset  -nausea  -trouble sleeping  This list may not describe all possible side effects. Call your doctor for medical advice about side effects. You may report side effects to FDA at 7-899-XOV-7980.  Where should I keep my medicine?  Keep out of the reach of children.  Store at room temperature between 15 and 30 degrees C (59 and 86 degrees F). Throw away any unused medicine after the expiration date.  NOTE: This sheet is a summary. It may not cover all possible information. If you have questions about this medicine, talk to your doctor, pharmacist, or health care provider.  © 2018 [employer]/Gold Standard (2015-08-25 10:26:14)  Fat and Cholesterol Restricted Diet  High levels of fat and cholesterol in your blood may lead to various health problems, such as diseases of the heart, blood vessels, gallbladder, liver, and pancreas. Fats are concentrated sources of energy that come in various forms. Certain types of fat, including saturated fat, may be harmful in excess. Cholesterol is a substance needed by your body in small amounts. Your body makes all the cholesterol it needs. Excess cholesterol comes from the food you eat.  When you have high levels of cholesterol and saturated fat in your blood, health problems can develop because the excess fat and cholesterol will gather along the walls of your blood vessels, causing them to narrow. Choosing the right foods will help you control your intake of fat and cholesterol. This will help keep the levels of these substances in your blood  "within normal limits and reduce your risk of disease.  WHAT IS MY PLAN?  Your health care provider recommends that you:  · Get no more than __________ % of the total calories in your daily diet from fat.  · Limit your intake of saturated fat to less than ______% of your total calories each day.  · Limit the amount of cholesterol in your diet to less than _________mg per day.  WHAT TYPES OF FAT SHOULD I CHOOSE?  · Choose healthy fats more often. Choose monounsaturated and polyunsaturated fats, such as olive and canola oil, flaxseeds, walnuts, almonds, and seeds.  · Eat more omega-3 fats. Good choices include salmon, mackerel, sardines, tuna, flaxseed oil, and ground flaxseeds. Aim to eat fish at least two times a week.  · Limit saturated fats. Saturated fats are primarily found in animal products, such as meats, butter, and cream. Plant sources of saturated fats include palm oil, palm kernel oil, and coconut oil.  · Avoid foods with partially hydrogenated oils in them. These contain trans fats. Examples of foods that contain trans fats are stick margarine, some tub margarines, cookies, crackers, and other baked goods.  WHAT GENERAL GUIDELINES DO I NEED TO FOLLOW?  These guidelines for healthy eating will help you control your intake of fat and cholesterol:  · Check food labels carefully to identify foods with trans fats or high amounts of saturated fat.  · Fill one half of your plate with vegetables and green salads.  · Fill one fourth of your plate with whole grains. Look for the word \"whole\" as the first word in the ingredient list.  · Fill one fourth of your plate with lean protein foods.  · Limit fruit to two servings a day. Choose fruit instead of juice.  · Eat more foods that contain soluble fiber. Examples of foods that contain this type of fiber are apples, broccoli, carrots, beans, peas, and barley. Aim to get 20-30 g of fiber per day.  · Eat more home-cooked food and less restaurant, buffet, and fast " food.  · Limit or avoid alcohol.  · Limit foods high in starch and sugar.  · Limit fried foods.  · Cook foods using methods other than frying. Baking, boiling, grilling, and broiling are all great options.  · Lose weight if you are overweight. Losing just 5-10% of your initial body weight can help your overall health and prevent diseases such as diabetes and heart disease.  WHAT FOODS CAN I EAT?  Grains  Whole grains, such as whole wheat or whole grain breads, crackers, cereals, and pasta. Unsweetened oatmeal, bulgur, barley, quinoa, or brown rice. Corn or whole wheat flour tortillas.  Vegetables  Fresh or frozen vegetables (raw, steamed, roasted, or grilled). Green salads.  Fruits  All fresh, canned (in natural juice), or frozen fruits.  Meat and Other Protein Products  Ground beef (85% or leaner), grass-fed beef, or beef trimmed of fat. Skinless chicken or turkey. Ground chicken or turkey. Pork trimmed of fat. All fish and seafood. Eggs. Dried beans, peas, or lentils. Unsalted nuts or seeds. Unsalted canned or dry beans.  Dairy  Low-fat dairy products, such as skim or 1% milk, 2% or reduced-fat cheeses, low-fat ricotta or cottage cheese, or plain low-fat yogurt.  Fats and Oils  Tub margarines without trans fats. Light or reduced-fat mayonnaise and salad dressings. Avocado. Olive, canola, sesame, or safflower oils. Natural peanut or almond butter (choose ones without added sugar and oil).  The items listed above may not be a complete list of recommended foods or beverages. Contact your dietitian for more options.  WHAT FOODS ARE NOT RECOMMENDED?  Grains  White bread. White pasta. White rice. Cornbread. Bagels, pastries, and croissants. Crackers that contain trans fat.  Vegetables  White potatoes. Corn. Creamed or fried vegetables. Vegetables in a cheese sauce.  Fruits  Dried fruits. Canned fruit in light or heavy syrup. Fruit juice.  Meat and Other Protein Products  Fatty cuts of meat. Ribs, chicken wings, izaguirre,  sausage, bologna, salami, chitterlings, fatback, hot dogs, bratwurst, and packaged luncheon meats. Liver and organ meats.  Dairy  Whole or 2% milk, cream, half-and-half, and cream cheese. Whole milk cheeses. Whole-fat or sweetened yogurt. Full-fat cheeses. Nondairy creamers and whipped toppings. Processed cheese, cheese spreads, or cheese curds.  Sweets and Desserts  Corn syrup, sugars, honey, and molasses. Candy. Jam and jelly. Syrup. Sweetened cereals. Cookies, pies, cakes, donuts, muffins, and ice cream.  Fats and Oils  Butter, stick margarine, lard, shortening, ghee, or izaguirre fat. Coconut, palm kernel, or palm oils.  Beverages  Alcohol. Sweetened drinks (such as sodas, lemonade, and fruit drinks or punches).  The items listed above may not be a complete list of foods and beverages to avoid. Contact your dietitian for more information.     This information is not intended to replace advice given to you by your health care provider. Make sure you discuss any questions you have with your health care provider.     Document Released: 12/18/2006 Document Revised: 01/08/2016 Document Reviewed: 03/18/2015  [employer] Patient Education ©2016 [employer]  Vitamin D Deficiency  Vitamin D deficiency is when your body does not have enough vitamin D. Vitamin D is important to your body for many reasons:  · It helps the body to absorb two important minerals, called calcium and phosphorus.  · It plays a role in bone health.  · It may help to prevent some diseases, such as diabetes and multiple sclerosis.  · It plays a role in muscle function, including heart function.  You can get vitamin D by:  · Eating foods that naturally contain vitamin D.  · Eating or drinking milk or other dairy products that have vitamin D added to them.  · Taking a vitamin D supplement or a multivitamin supplement that contains vitamin D.  · Being in the sun. Your body naturally makes vitamin D when your skin is exposed to sunlight. Your  body changes the sunlight into a form of the vitamin that the body can use.  If vitamin D deficiency is severe, it can cause a condition in which your bones become soft. In adults, this condition is called osteomalacia. In children, this condition is called rickets.  What are the causes?  Vitamin D deficiency may be caused by:  · Not eating enough foods that contain vitamin D.  · Not getting enough sun exposure.  · Having certain digestive system diseases that make it difficult for your body to absorb vitamin D. These diseases include Crohn disease, chronic pancreatitis, and cystic fibrosis.  · Having a surgery in which a part of the stomach or a part of the small intestine is removed.  · Being obese.  · Having chronic kidney disease or liver disease.  What increases the risk?  This condition is more likely to develop in:  · Older people.  · People who do not spend much time outdoors.  · People who live in a long-term care facility.  · People who have had broken bones.  · People with weak or thin bones (osteoporosis).  · People who have a disease or condition that changes how the body absorbs vitamin D.  · People who have dark skin.  · People who take certain medicines, such as steroid medicines or certain seizure medicines.  · People who are overweight or obese.  What are the signs or symptoms?  In mild cases of vitamin D deficiency, there may not be any symptoms. If the condition is severe, symptoms may include:  · Bone pain.  · Muscle pain.  · Falling often.  · Broken bones caused by a minor injury.  How is this diagnosed?  This condition is usually diagnosed with a blood test.  How is this treated?  Treatment for this condition may depend on what caused the condition. Treatment options include:  · Taking vitamin D supplements.  · Taking a calcium supplement. Your health care provider will suggest what dose is best for you.  Follow these instructions at home:  · Take medicines and supplements only as told by your  health care provider.  · Eat foods that contain vitamin D. Choices include:  ¨ Fortified dairy products, cereals, or juices. Fortified means that vitamin D has been added to the food. Check the label on the package to be sure.  ¨ Fatty fish, such as salmon or trout.  ¨ Eggs.  ¨ Oysters.  · Do not use a tanning bed.  · Maintain a healthy weight. Lose weight, if needed.  · Keep all follow-up visits as told by your health care provider. This is important.  Contact a health care provider if:  · Your symptoms do not go away.  · You feel like throwing up (nausea) or you throw up (vomit).  · You have fewer bowel movements than usual or it is difficult for you to have a bowel movement (constipation).  This information is not intended to replace advice given to you by your health care provider. Make sure you discuss any questions you have with your health care provider.  Document Released: 03/11/2013 Document Revised: 05/31/2017 Document Reviewed: 05/04/2016  [employer] Interactive Patient Education © 2017 [employer]  Preventing Type 2 Diabetes Mellitus  Type 2 diabetes (type 2 diabetes mellitus) is a long-term (chronic) disease that affects blood sugar (glucose) levels. Normally, a hormone called insulin allows glucose to enter cells in the body. The cells use glucose for energy. In type 2 diabetes, one or both of these problems may be present:  · The body does not make enough insulin.  · The body does not respond properly to insulin that it makes (insulin resistance).  Insulin resistance or lack of insulin causes excess glucose to build up in the blood instead of going into cells. As a result, high blood glucose (hyperglycemia) develops, which can cause many complications. Being overweight or obese and having an inactive (sedentary) lifestyle can increase your risk for diabetes. Type 2 diabetes can be delayed or prevented by making certain nutrition and lifestyle changes.  What nutrition changes can be made?  · Eat  healthy meals and snacks regularly. Keep a healthy snack with you for when you get hungry between meals, such as fruit or a handful of nuts.  · Eat lean meats and proteins that are low in saturated fats, such as chicken, fish, egg whites, and beans. Avoid processed meats.  · Eat plenty of fruits and vegetables and plenty of grains that have not been processed (whole grains). It is recommended that you eat:  ¨ 1½?2 cups of fruit every day.  ¨ 2½?3 cups of vegetables every day.  ¨ 6?8 oz of whole grains every day, such as oats, whole wheat, bulgur, brown rice, quinoa, and millet.  · Eat low-fat dairy products, such as milk, yogurt, and cheese.  · Eat foods that contain healthy fats, such as nuts, avocado, olive oil, and canola oil.  · Drink water throughout the day. Avoid drinks that contain added sugar, such as soda or sweet tea.  · Follow instructions from your health care provider about specific eating or drinking restrictions.  · Control how much food you eat at a time (portion size).  ¨ Check food labels to find out the serving sizes of foods.  ¨ Use a kitchen scale to weigh amounts of foods.  · Saute or steam food instead of frying it. Cook with water or broth instead of oils or butter.  · Limit your intake of:  ¨ Salt (sodium). Have no more than 1 tsp (2,400 mg) of sodium a day. If you have heart disease or high blood pressure, have less than ½?¾ tsp (1,500 mg) of sodium a day.  ¨ Saturated fat. This is fat that is solid at room temperature, such as butter or fat on meat.  What lifestyle changes can be made?   Activity  · Do moderate-intensity physical activity for at least 30 minutes on at least 5 days of the week, or as much as told by your health care provider.  · Ask your health care provider what activities are safe for you. A mix of physical activities may be best, such as walking, swimming, cycling, and strength training.  · Try to add physical activity into your day. For example:  ¨ Park in spots that  are farther away than usual, so that you walk more. For example, park in a far corner of the parking lot when you go to the office or the grocery store.  ¨ Take a walk during your lunch break.  ¨ Use stairs instead of elevators or escalators.  Weight Loss  · Lose weight as directed. Your health care provider can determine how much weight loss is best for you and can help you lose weight safely.  · If you are overweight or obese, you may be instructed to lose at least 5?7 % of your body weight.  Alcohol and Tobacco   · Limit alcohol intake to no more than 1 drink a day for nonpregnant women and 2 drinks a day for men. One drink equals 12 oz of beer, 5 oz of wine, or 1½ oz of hard liquor.  · Do not use any tobacco products, such as cigarettes, chewing tobacco, and e-cigarettes. If you need help quitting, ask your health care provider.  Work With Your Health Care Provider  · Have your blood glucose tested regularly, as told by your health care provider.  · Discuss your risk factors and how you can reduce your risk for diabetes.  · Get screening tests as told by your health care provider. You may have screening tests regularly, especially if you have certain risk factors for type 2 diabetes.  · Make an appointment with a diet and nutrition specialist (registered dietitian). A registered dietitian can help you make a healthy eating plan and can help you understand portion sizes and food labels.  Why are these changes important?  · It is possible to prevent or delay type 2 diabetes and related health problems by making lifestyle and nutrition changes.  · It can be difficult to recognize signs of type 2 diabetes. The best way to avoid possible damage to your body is to take actions to prevent the disease before you develop symptoms.  What can happen if changes are not made?  · Your blood glucose levels may keep increasing. Having high blood glucose for a long time is dangerous. Too much glucose in your blood can damage  your blood vessels, heart, kidneys, nerves, and eyes.  · You may develop prediabetes or type 2 diabetes. Type 2 diabetes can lead to many chronic health problems and complications, such as:  ¨ Heart disease.  ¨ Stroke.  ¨ Blindness.  ¨ Kidney disease.  ¨ Depression.  ¨ Poor circulation in the feet and legs, which could lead to surgical removal (amputation) in severe cases.  Where to find support:  · Ask your health care provider to recommend a registered dietitian, diabetes educator, or weight loss program.  · Look for local or online weight loss groups.  · Join a gym, fitness club, or outdoor activity group, such as a walking club.  Where to find more information:  To learn more about diabetes and diabetes prevention, visit:  · American Diabetes Association (ADA): www.diabetes.org  · National [place] of Diabetes and Digestive and Kidney Diseases: www.niddk.nih.gov/health-information/diabetes  To learn more about healthy eating, visit:  · The U.S. Department of Agriculture ([employer]), Choose My Plate: www.[employer].gov/food-groups  · Office of Disease Prevention and Health Promotion (ODPHP), Dietary Guidelines: www.health.gov/dietaryguidelines  Summary  · You can reduce your risk for type 2 diabetes by increasing your physical activity, eating healthy foods, and losing weight as directed.  · Talk with your health care provider about your risk for type 2 diabetes. Ask about any blood tests or screening tests that you need to have.  This information is not intended to replace advice given to you by your health care provider. Make sure you discuss any questions you have with your health care provider.  Document Released: 04/10/2017 Document Revised: 05/25/2017 Document Reviewed: 02/07/2017  Elsevier Interactive Patient Education © 2017 [employer]  Prediabetes  Prediabetes is the condition of having a blood sugar (blood glucose) level that is higher than it should be, but not high enough for you to be diagnosed  with type 2 diabetes. Having prediabetes puts you at risk for developing type 2 diabetes (type 2 diabetes mellitus). Prediabetes may be called impaired glucose tolerance or impaired fasting glucose.  Prediabetes usually does not cause symptoms. Your health care provider can diagnose this condition with blood tests. You may be tested for prediabetes if you are overweight and if you have at least one other risk factor for prediabetes.  Risk factors for prediabetes include:  · Having a family member with type 2 diabetes.  · Being overweight or obese.  · Being older than age 45.  · Being of American-, -American, /, or / descent.  · Having an inactive (sedentary) lifestyle.  · Having a history of gestational diabetes or polycystic ovarian syndrome (PCOS).  · Having low levels of good cholesterol (HDL-C) or high levels of blood fats (triglycerides).  · Having high blood pressure.  What is blood glucose and how is blood glucose measured?     Blood glucose refers to the amount of glucose in your bloodstream. Glucose comes from eating foods that contain sugars and starches (carbohydrates) that the body breaks down into glucose. Your blood glucose level may be measured in mg/dL (milligrams per deciliter) or mmol/L (millimoles per liter). Your blood glucose may be checked with one or more of the following blood tests:  · A fasting blood glucose (FBG) test. You will not be allowed to eat (you will fast) for at least 8 hours before a blood sample is taken.  ¨ A normal range for FBG is  mg/dl (3.9-5.6 mmol/L).  · An A1c (hemoglobin A1c) blood test. This test provides information about blood glucose control over the previous 2?3 months.  · An oral glucose tolerance test (OGTT). This test measures your blood glucose twice:  ¨ After fasting. This is your baseline level.  ¨ Two hours after you drink a beverage that contains glucose.  You may be diagnosed with prediabetes:  · If  your FBG is 100?125 mg/dL (5.6-6.9 mmol/L).  · If your A1c level is 5.7?6.4%.  · If your OGGT result is 140?199 mg/dL (7.8-11 mmol/L).  These blood tests may be repeated to confirm your diagnosis.  What happens if blood glucose is too high?  The pancreas produces a hormone (insulin) that helps move glucose from the bloodstream into cells. When cells in the body do not respond properly to insulin that the body makes (insulin resistance), excess glucose builds up in the blood instead of going into cells. As a result, high blood glucose (hyperglycemia) can develop, which can cause many complications. This is a symptom of prediabetes.  What can happen if blood glucose stays higher than normal for a long time?  Having high blood glucose for a long time is dangerous. Too much glucose in your blood can damage your nerves and blood vessels. Long-term damage can lead to complications from diabetes, which may include:  · Heart disease.  · Stroke.  · Blindness.  · Kidney disease.  · Depression.  · Poor circulation in the feet and legs, which could lead to surgical removal (amputation) in severe cases.  How can prediabetes be prevented from turning into type 2 diabetes?     To help prevent type 2 diabetes, take the following actions:  · Be physically active.  ¨ Do moderate-intensity physical activity for at least 30 minutes on at least 5 days of the week, or as much as told by your health care provider. This could be brisk walking, biking, or water aerobics.  ¨ Ask your health care provider what activities are safe for you. A mix of physical activities may be best, such as walking, swimming, cycling, and strength training.  · Lose weight as told by your health care provider.  ¨ Losing 5-7% of your body weight can reverse insulin resistance.  ¨ Your health care provider can determine how much weight loss is best for you and can help you lose weight safely.  · Follow a healthy meal plan. This includes eating lean proteins,  complex carbohydrates, fresh fruits and vegetables, low-fat dairy products, and healthy fats.  ¨ Follow instructions from your health care provider about eating or drinking restrictions.  ¨ Make an appointment to see a diet and nutrition specialist (registered dietitian) to help you create a healthy eating plan that is right for you.  · Do not smoke or use any tobacco products, such as cigarettes, chewing tobacco, and e-cigarettes. If you need help quitting, ask your health care provider.  · Take over-the-counter and prescription medicines as told by your health care provider. You may be prescribed medicines that help lower the risk of type 2 diabetes.  This information is not intended to replace advice given to you by your health care provider. Make sure you discuss any questions you have with your health care provider.  Document Released: 04/10/2017 Document Revised: 05/25/2017 Document Reviewed: 02/07/2017  [employer] Interactive Patient Education © 2017 Elsevier Inc.

## 2019-06-12 NOTE — PROGRESS NOTES
cc: Well woman exam with Pap, hyperlipidemia, prediabetes, vitamin D deficiency    Subjective:     Joellen Wakefield is a 38 y.o. female presenting for her well woman exam.  Her LMP is Patient's last menstrual period was 05/29/2019 (approximate)..  Her previous Paps have been negative.  Her menstrual cycle is regular.  Her previous mammogram was normal.  Family history of any reproductive cancers negative.  History of STD negative, yeast negative, bacterial vaginosis negative, urinary tract infections negative.  Denies any hematuria or dysuria and stooling and urinating well.  She reports her allergies are doing better.  Both the montelukast and Flonase helped and she would like some refills.  She Already did her mammogram and she does have some dense breasts but no other issues and she will do her screening mammogram in 1 year.  Her hoarseness is better and she we will hold off seeing the ear nose or throat or sleep doctor.  She has had 3 vaginal deliveries and no birth control right now.  Denies any leakage of her any incontinence.  Her blood pressure has been fine 120s over 70s.  Complete blood count within normal limits, complete metabolic panel within normal limits with fasting glucose at 102, A1c 5.9, TSH thyroid within normal limits, lipid panel with LDL mildly high at 112, HDL 44, triglycerides high at 180, and total cholesterol of 192.  Vitamin D low at 17.1, vitamin B12 normal at 972, RPR negative, HIV negative and chlamydia and gonorrhea negative.  Review of systems:     Constitutional: Negative for fever, chills and negative fatigue.   HENT: Negative for sinus pressure  Eyes: Negative for blurriness, negative for double vision  Respiratory: Negative for cough and shortness of breath, negative for exertional shortness of breath, + snoring  Cardiovascular: Negative for leg swelling, negative for palpitations, negative for chest pain  Gastrointestinal: Negative for nausea, vomiting, abdominal pain,  "constipation and diarrhea.  Genitourinary: Negative for dysuria and hematuria.   Skin: Negative for rash.   Neurological: Negative for dizziness, focal weakness and headaches.   Endo/Heme/Allergies: Denies bleeding, bruising, and recurrent allergies.  Psychiatric/Behavioral: Negative for depression and anxiety.      Current Outpatient Prescriptions:   •  fluticasone (FLONASE) 50 MCG/ACT nasal spray, Spray 1 Spray in nose every day., Disp: 16 g, Rfl: 0  •  montelukast (SINGULAIR) 10 MG Tab, Take 1 Tab by mouth every day., Disp: 90 Tab, Rfl: 1  •  omeprazole (PRILOSEC) 20 MG delayed-release capsule, Take 1 Cap by mouth every day., Disp: 90 Cap, Rfl: 1  •  ergocalciferol (DRISDOL) 32769 UNIT capsule, Take one tab po once a week., Disp: 12 Cap, Rfl: 1  •  Omega-3 Fatty Acids (FISH OIL) 1000 MG Cap capsule, Take 1,000 mg by mouth 3 times a day, with meals., Disp: , Rfl:     Allergies, past medical history, past surgical history, family history, social history reviewed and updated    Objective:     Vitals: /82 (BP Location: Left arm, Patient Position: Sitting, BP Cuff Size: Adult)   Pulse 68   Temp 36.7 °C (98 °F) (Temporal)   Resp 12   Ht 1.575 m (5' 2\")   Wt 69.2 kg (152 lb 9.6 oz)   LMP 05/29/2019 (Approximate)   SpO2 95%   Breastfeeding? No   BMI 27.91 kg/m²    General: Alert, pleasant, NAD  HEENT: Normocephalic.  Nontraumatic. EOMI, no icterus or pallor.  Conjunctivae and lids normal. External ears normal.   Heart: Regular rate and rhythm.  S1 and S2 normal.  No murmurs appreciated.  Respiratory: Normal respiratory effort.  Clear to auscultation bilaterally.  Abdomen: Non-distended, soft, non tender in all 4 quadrants.  Skin: Warm, dry, no rashes.  Musculoskeletal: Gait is normal.  Moves all extremities well.  Extremities: No leg edema.     examined with chaperone SHERICE Ramirez and bilateral breast exam done within normal limits with no masses or lumps felt with the bilateral breasts and " bilateral axilla on palpation and no nipple discharge and cervical exam with labia exam with no lesions or masses felt and cervix visualized with minimal cervical friability and no cervical motion tenderness, no lesions from cervix and on pelvic examination and palpation, normal positioning of ovaries and no masses felt and no leakage on urine on coughing.  Pap test was done with the brush and spatula.  She was also swab for vaginal pathogen swab for mild white vaginal discharge but no tenderness.  Psych:  Affect/mood is normal, judgement is good, memory is intact, grooming is appropriate.    Assessment/Plan:     Diagnoses and all orders for this visit:    Well female exam with routine gynecological exam  -     THINPREP PAP WITH HPV; Future    Screening for cervical cancer  -     THINPREP PAP WITH HPV; Future    Seasonal allergic rhinitis due to pollen  -     fluticasone (FLONASE) 50 MCG/ACT nasal spray; Spray 1 Spray in nose every day.  -     montelukast (SINGULAIR) 10 MG Tab; Take 1 Tab by mouth every day.    Gastroesophageal reflux disease without esophagitis  -     omeprazole (PRILOSEC) 20 MG delayed-release capsule; Take 1 Cap by mouth every day.    Vitamin D deficiency  -     ergocalciferol (DRISDOL) 74860 UNIT capsule; Take one tab po once a week.    Hyperlipidemia, unspecified hyperlipidemia type    Prediabetes    BMI 27.0-27.9,adult    Medication monitoring encounter    Hyperglycemia    Vaginal discharge  -     VAGINAL PATHOGENS DNA PANEL; Future    Sleep apnea, unspecified type  -     REFERRAL TO SLEEP STUDIES    - Complete blood count within normal limits, complete metabolic panel within normal limits with fasting glucose at 102, A1c 5.9, TSH thyroid within normal limits, lipid panel with LDL mildly high at 112, HDL 44, triglycerides high at 180, and total cholesterol of 192.  Vitamin D low at 17.1, vitamin B12 normal at 972, RPR negative, HIV negative and chlamydia and gonorrhea negative.  -Went over lab  work as above and will start high-dose vitamin D 50,000 units once a week for 3 to 6 months.  We will also look at patient instruction sheet and work on high cholesterol with a fat restricted diet and look at what prediabetes is and how to prevent diabetes and we will recheck her A1c in 3 months and she will work on her diet and can do omega-3 fatty acid fish oil twice a day with meals and red yeast over-the-counter twice a day with meals for cholesterol and also can do lose it, my fitness tracker or weight watchers jyoti to try to lose about 5 pounds a month for a total of 15 to 20 pounds.  We will recheck her weight in 3 months and see how this does.  We will also send referral for sleep study as she is never had one for concern for sleep apnea.  STD screening negative and mammogram not due for another year.  Pap test and vaginal wet prep swab done.  For allergies will continue montelukast daily at night and Flonase spray in the morning and can do saline ounces or [place] for allergies which is stable now.  She will also continue for her acid reflux omeprazole daily in the morning and avoid foods that can increase her heartburn.  We will see her back in about 3 months and get her POC A1c at this appointment.    Return in about 3 months (around 9/12/2019), or Prediabetes/Sleep/AIC.

## 2019-06-14 LAB
CANDIDA DNA VAG QL PROBE+SIG AMP: NEGATIVE
CYTOLOGY REG CYTOL: NORMAL
G VAGINALIS DNA VAG QL PROBE+SIG AMP: NEGATIVE
HPV HR 12 DNA CVX QL NAA+PROBE: NEGATIVE
HPV16 DNA SPEC QL NAA+PROBE: NEGATIVE
HPV18 DNA SPEC QL NAA+PROBE: NEGATIVE
SPECIMEN SOURCE: NORMAL
T VAGINALIS DNA VAG QL PROBE+SIG AMP: NEGATIVE

## 2019-06-21 ENCOUNTER — TELEPHONE (OUTPATIENT)
Dept: MEDICAL GROUP | Facility: PHYSICIAN GROUP | Age: 39
End: 2019-06-21

## 2019-06-21 NOTE — TELEPHONE ENCOUNTER
1st attempt:Phone Number Called: [phone]    Call outcome: left message for patient to call back regarding message below    Message: Advised to call back regarding lab results.

## 2019-06-21 NOTE — TELEPHONE ENCOUNTER
----- Message from Argelia Rivera M.D. sent at 6/20/2019  5:44 PM PDT -----  Please call patient and let patient know Pap test is within normal limits and HPV is negative and so I recommend to get the screening again of cotesting with HPV and Pap in 3 years unless she has ever had abnormal Pap in the past, she changes sexual partner, she has any vaginal concerns or issues, or if she would like to by choice then she can repeat this in 1 year, thank you.  Also vaginal pathogen exam is negative and no yeast or bacterial vaginosis and please keep your follow-up appointment in September or return to clinic earlier if you have any other new concerns or issues to discuss otherwise I will see you at your follow-up.  Thanks.

## 2019-07-02 DIAGNOSIS — G47.30 SLEEP APNEA, UNSPECIFIED TYPE: ICD-10-CM

## 2019-07-02 NOTE — PROGRESS NOTES
Patient needed new referral and placed for [facility] and sleep for sleep study and sleep consultation for sleep apnea as her insurance only covers this one.

## 2019-07-31 ENCOUNTER — PATIENT MESSAGE (OUTPATIENT)
Dept: MEDICAL GROUP | Facility: PHYSICIAN GROUP | Age: 39
End: 2019-07-31

## 2019-07-31 ENCOUNTER — TELEPHONE (OUTPATIENT)
Dept: MEDICAL GROUP | Facility: PHYSICIAN GROUP | Age: 39
End: 2019-07-31

## 2019-07-31 NOTE — TELEPHONE ENCOUNTER
Please call and let patient know that this is not something that I is a doctor can address.  She will need to talk to her insurance and the billing department to find out what is going on and in the future I always suggest for patients to please call their insurance company first to see if there are any issues before getting any labs done but please follow-up with billing department and your insurance company.  I am sorry that this happened but unfortunately there is not much I can do.  Thanks.

## 2019-07-31 NOTE — TELEPHONE ENCOUNTER
1. Caller Name: Joellen                                          Call Back Number: [phone] (home)        Patient approves a detailed voicemail message: N\A    pt called and states she recieved a bill stating that the   lab blood work is not covered by ins. Pt was informed that the most recent lab blood work, that I can see completed in her chart, was ordered by a different provider. Pt argued and was informed we would run it by PCP. Pt states she needs an appeal submitted to have labs covered.

## 2019-08-06 NOTE — PATIENT COMMUNICATION
Please direct this to [facility] or ask the patient to find out from the insurance company and sent to us a fax or paper as to what exactly they need and if it is just a quick letter we can do that and fax and give us exact details or if it is more involved than please make an appointment and we could address this in person at appointment and find out exact details.  And in the future keep in mind that the doctor just does the orders for what is medical be necessary and then before making any tests or imaging or labs or vaccines, if there are any concerns or doubts that always please call your insurance company before you do this as most of them do approve it but some need some authorization so please find out exactly what it is and either send this to us and in the future billing and/or [employer] can help with these type of questions, if just letter then please find out what exactly and we could draft up this letter and I could sign this, so please ask her to send this from the insurance by calling them and asking them to send paperwork to send to your doctor what exactly they need and if it is more involved than we may need to make an appointment for this.  Thanks.

## 2019-10-07 ENCOUNTER — OFFICE VISIT (OUTPATIENT)
Dept: MEDICAL GROUP | Facility: PHYSICIAN GROUP | Age: 39
End: 2019-10-07
Payer: COMMERCIAL

## 2019-10-07 VITALS
BODY MASS INDEX: 28.52 KG/M2 | HEART RATE: 70 BPM | WEIGHT: 155 LBS | TEMPERATURE: 97.5 F | OXYGEN SATURATION: 99 % | HEIGHT: 62 IN | DIASTOLIC BLOOD PRESSURE: 74 MMHG | SYSTOLIC BLOOD PRESSURE: 112 MMHG | RESPIRATION RATE: 14 BRPM

## 2019-10-07 DIAGNOSIS — K21.9 GASTROESOPHAGEAL REFLUX DISEASE WITHOUT ESOPHAGITIS: ICD-10-CM

## 2019-10-07 DIAGNOSIS — K76.0 FATTY LIVER: ICD-10-CM

## 2019-10-07 DIAGNOSIS — E78.5 DYSLIPIDEMIA: ICD-10-CM

## 2019-10-07 DIAGNOSIS — E55.9 VITAMIN D DEFICIENCY: ICD-10-CM

## 2019-10-07 DIAGNOSIS — R73.03 PREDIABETES: ICD-10-CM

## 2019-10-07 DIAGNOSIS — J30.1 SEASONAL ALLERGIC RHINITIS DUE TO POLLEN: ICD-10-CM

## 2019-10-07 DIAGNOSIS — R73.9 HYPERGLYCEMIA: ICD-10-CM

## 2019-10-07 DIAGNOSIS — Z51.81 MEDICATION MONITORING ENCOUNTER: ICD-10-CM

## 2019-10-07 DIAGNOSIS — G47.30 SLEEP APNEA, UNSPECIFIED TYPE: ICD-10-CM

## 2019-10-07 DIAGNOSIS — R74.8 ELEVATED LIVER ENZYMES: ICD-10-CM

## 2019-10-07 LAB
HBA1C MFR BLD: 5.5 % (ref 0–5.6)
INT CON NEG: NEGATIVE
INT CON POS: POSITIVE

## 2019-10-07 PROCEDURE — 99214 OFFICE O/P EST MOD 30 MIN: CPT | Performed by: FAMILY MEDICINE

## 2019-10-07 PROCEDURE — 83036 HEMOGLOBIN GLYCOSYLATED A1C: CPT | Performed by: FAMILY MEDICINE

## 2019-10-07 RX ORDER — OMEPRAZOLE 20 MG/1
20 CAPSULE, DELAYED RELEASE ORAL DAILY
Qty: 90 CAP | Refills: 0 | Status: SHIPPED | OUTPATIENT
Start: 2019-10-07 | End: 2020-04-23

## 2019-10-07 RX ORDER — MONTELUKAST SODIUM 10 MG/1
10 TABLET ORAL DAILY
Qty: 90 TAB | Refills: 1 | Status: SHIPPED | OUTPATIENT
Start: 2019-10-07 | End: 2022-01-16

## 2019-10-07 RX ORDER — FLUTICASONE PROPIONATE 50 MCG
1 SPRAY, SUSPENSION (ML) NASAL DAILY
Qty: 16 G | Refills: 3 | Status: SHIPPED | OUTPATIENT
Start: 2019-10-07 | End: 2022-01-16

## 2019-10-07 RX ORDER — OMEPRAZOLE 20 MG/1
CAPSULE, DELAYED RELEASE ORAL
Refills: 0 | COMMUNITY
Start: 2019-10-06 | End: 2019-10-07

## 2019-10-07 NOTE — PROGRESS NOTES
cc: Prediabetes, BMI 28, vitamin D deficiency    Subjective:     Joellen Wakefield is a 39 y.o. female presenting she was last seen June 12 for well woman exam and her weight then was 152 pounds and today is 155 pounds.  She still taking the vitamin D.  She was prediabetic last time.  She is doing the fish oil twice a day with meals.  She did see sleep study but they said that her sleep apnea was mild and she does not need treatment right now.  They said it was mild and if she wanted to wear the mask but she does not want to right now.  They did tell her if she lose some weight that this will help.  Allergies are doing good.  She still doing the montelukast.  She is not having any heartburn and she is doing okay with this.  A1c improved from 5.9 today to 5.5.  But her weight did go up but she has reduced heart carbohydrates.  She also needed a letter as she is vitamin D deficient and it was medically needed to get the lab tests.    Review of systems:     Constitutional: Negative for fever, chills and negative fatigue.   HENT: Negative for sinus pressure  Eyes: Negative for blurriness  Respiratory: Negative for cough and shortness of breath, negative for exertional shortness of breath  Cardiovascular: Negative for leg swelling, negative for palpitations, negative for chest pain  Gastrointestinal: Negative for nausea, vomiting, abdominal pain, constipation and diarrhea.  Genitourinary: Negative for dysuria and hematuria.   Neurological: Negative for dizziness, focal weakness and headaches.   Endo/Heme/Allergies: Denies bleeding, bruising, and recurrent allergies.  Psychiatric/Behavioral: Negative for depression and anxiety.        Current Outpatient Medications:   •  omeprazole (PRILOSEC) 20 MG delayed-release capsule, Take 1 Cap by mouth every day., Disp: 90 Cap, Rfl: 0  •  fluticasone (FLONASE) 50 MCG/ACT nasal spray, Spray 1 Spray in nose every day., Disp: 16 g, Rfl: 3  •  montelukast (SINGULAIR) 10 MG Tab,  "Take 1 Tab by mouth every day., Disp: 90 Tab, Rfl: 1  •  ergocalciferol (DRISDOL) 94561 UNIT capsule, Take one tab po once a week., Disp: 12 Cap, Rfl: 1  •  Omega-3 Fatty Acids (FISH OIL) 1000 MG Cap capsule, Take 1,000 mg by mouth 3 times a day, with meals., Disp: , Rfl:     Allergies, past medical history, past surgical history, family history, social history reviewed and updated    Objective:     Vitals: /74 (BP Location: Left arm, Patient Position: Sitting, BP Cuff Size: Adult)   Pulse 70   Temp 36.4 °C (97.5 °F) (Temporal)   Resp 14   Ht 1.575 m (5' 2\")   Wt 70.3 kg (155 lb)   LMP  (LMP Unknown)   SpO2 99%   Breastfeeding? No   BMI 28.35 kg/m²   General: Alert, pleasant, NAD  HEENT: Normocephalic.  Nontraumatic. EOMI, no icterus or pallor.  Conjunctivae and lids normal. External ears normal.   Heart: Regular rate and rhythm.  S1 and S2 normal.  No murmurs appreciated.  Respiratory: Normal respiratory effort.  Clear to auscultation bilaterally.  Skin: Warm, dry, no rashes.  Musculoskeletal: Gait is normal.  Moves all extremities well.  Extremities: No leg edema.    Psych:  Affect/mood is normal, judgement is good, memory is intact, grooming is appropriate.    Assessment/Plan:     Diagnoses and all orders for this visit:    Hyperglycemia  -     POCT Hemoglobin A1C    Prediabetes  -     POCT Hemoglobin A1C    BMI 28.0-28.9,adult    Gastroesophageal reflux disease without esophagitis  -     omeprazole (PRILOSEC) 20 MG delayed-release capsule; Take 1 Cap by mouth every day.    Medication monitoring encounter  -     Comp Metabolic Panel; Future    Vitamin D deficiency    Seasonal allergic rhinitis due to pollen  -     fluticasone (FLONASE) 50 MCG/ACT nasal spray; Spray 1 Spray in nose every day.  -     montelukast (SINGULAIR) 10 MG Tab; Take 1 Tab by mouth every day.    Sleep apnea, unspecified type    Elevated liver enzymes  -     Comp Metabolic Panel; Future    Fatty liver  -     Comp Metabolic " Panel; Future    Dyslipidemia  -     Lipid Profile; Future    -at least 1 week before you see me please get fasting lab work 8 hours of no eating but drink plenty of water and we will recheck her cholesterol and her liver and kidney function.  Her A1c today improved to 5.5 from 5.9 so good job keep working on carb counting and reading patient instruction section and her sleep apnea is mild and she wants to hold off on the CPAP so please work on weight loss 5 pounds a month as her BMI is 28 and we would like at 25 so I showed her different apps to try such as weight watchers, lose it, my fitness pal and for grocery shopping look at food labels to decrease carbohydrates and read the patient instructions on what are carbohydrates and to increase your protein and fiber and decrease your red meat and have more white meat and less red meat and fried food and meal planning and portion control and less hidden sugars and salts and she is going to try to work on weight loss in 3 months especially due to the fatty liver and sleep apnea but good job on improving the prediabetes.  She could slowly wean off of omeprazole 20 mg for her heartburn and her allergies are stable and when she is done the high-dose vitamin D for 6 months then she could switch to daily over-the-counter vitamin D 4000 units daily.  And for her insurance paper given that it was medically necessary for her to do the vitamin D test as she was deficient and for woman this is important to prevent ostial porosis and bone density loss.  We will see her back in 3 months to go over her lab work and see how she is doing working on her weight for her fatty liver and sleep apnea.  Flu vaccine offered but she declined for now and we went over the risks and benefits.    Return in about 3 months (around 1/7/2020), or weight/lab FU/prediabetes improved.

## 2019-10-07 NOTE — PATIENT INSTRUCTIONS
Diagnoses and all orders for this visit:    Hyperglycemia  -     POCT Hemoglobin A1C    Prediabetes  -     POCT Hemoglobin A1C    BMI 28.0-28.9,adult    Gastroesophageal reflux disease without esophagitis  -     omeprazole (PRILOSEC) 20 MG delayed-release capsule; Take 1 Cap by mouth every day.    Medication monitoring encounter  -     Comp Metabolic Panel; Future    Vitamin D deficiency    Seasonal allergic rhinitis due to pollen  -     fluticasone (FLONASE) 50 MCG/ACT nasal spray; Spray 1 Spray in nose every day.  -     montelukast (SINGULAIR) 10 MG Tab; Take 1 Tab by mouth every day.    Sleep apnea, unspecified type    Elevated liver enzymes  -     Comp Metabolic Panel; Future    Fatty liver  -     Comp Metabolic Panel; Future    Dyslipidemia  -     Lipid Profile; Future    -at least 1 week before you see me please get fasting lab work 8 hours of no eating but drink plenty of water and we will recheck her cholesterol and her liver and kidney function.  Her A1c today improved to 5.5 from 5.9 so good job keep working on carb counting and reading patient instruction section and her sleep apnea is mild and she wants to hold off on the CPAP so please work on weight loss 5 pounds a month as her BMI is 28 and we would like at 25 so I showed her different apps to try such as weight watchers, lose it, my fitness pal and for grocery shopping look at food labels to decrease carbohydrates and read the patient instructions on what are carbohydrates and to increase your protein and fiber and decrease your red meat and have more white meat and less red meat and fried food and meal planning and portion control and less hidden sugars and salts and she is going to try to work on weight loss in 3 months especially due to the fatty liver and sleep apnea but good job on improving the prediabetes.  She could slowly wean off of omeprazole 20 mg for her heartburn and her allergies are stable and when she is done the high-dose vitamin D  for 6 months then she could switch to daily over-the-counter vitamin D 4000 units daily.  And for her insurance paper given that it was medically necessary for her to do the vitamin D test as she was deficient and for woman this is important to prevent ostial porosis and bone density loss.  We will see her back in 3 months to go over her lab work and see how she is doing working on her weight for her fatty liver and sleep apnea.  Flu vaccine offered but she declined for now and we went over the risks and benefits.    Return in about 3 months (around 1/7/2020), or weight/lab FU/prediabetes improved.    Dieta de 1800 calorías para el plan de alimentación para la diabetes   (1800 Calorie Diet for Diabetes Meal Planning)   Esta dieta ha sido diseñada para comer hasta 1800 calorías cada día. Si sigue esta dieta y elije comidas saludables podrá mejorar bernardo alcon en general. Esta dieta controla los niveles de azúcar en la yaz (glucosa) y también puede ayudar a disminuir la presión arterial y el colesterol.   TAMAÑO DE LAS PORCIONES:   La medición de los alimentos y el tamaño de las porciones lo ayudará a controlar la cantidad exacta de comida que debe ingerir. La lista que sigue le mostrará el tamaño de algunas porciones comunes.   · 1 onzas (28 gr)........4 dados apilados.   · 3 onzas (85 gr) .....1 vicenta de cartas.   · 1 cucharadita.......... la punta del dedo meñique.   · 1 cucharada.............el pulgar.   · 2 cucharadas...........erickson pelota de golf.   · ½ taza      ..la mitad de un puño.   · 1 taza       un puño.   GUÍA PARA LA ELECCIÓN DE LOS ALIMENTOS   El objetivo de esta dieta es consumir alimentos variados y limitar la cantidad de calorías a 1800 por día. [place] puede lograrse eligiendo los alimentos bajos en calorías y en grasas. La dieta también aconseja consumir con frecuencia porciones pequeñas de alimentos. [place] ayuda a controlar los niveles de glucosa en la yaz de modo que no suba o baje demasiado. Cada  comida o colación puede incluir un alimento que sea carmen de proteínas para que lo ayude a sentirse más satisfecho y que se estabilice bernardo nivel de glucosa en la yaz. Trate de consumir aproximadamente la misma cantidad de alimentos a la misma hora todos los días. [place] también jessica los fines de semana, cuando viaje y los haley en que no trabaje. Separe jeronimo comidas con erickson diferencia de 4 a 5 horas y agregue erickson colación entre ellas, si lo desea.   Por ejemplo, un plan de alimentación diaria podría incluir el desayuno, erickson colación por la mañana, el almuerzo, la omar y erickson colación por la noche. Las comidas y colaciones saludables deben incluir granos enteros, verduras, frutas, rosie magras, aves, pescado y productos lácteos. Al planificar jeronimo comidas, seleccione alimentos variados. Elija entre los grupos de panes y féculas, vegetales, frutas, productos lácteos y rosie o proteínas. A continuación se dan algunos ejemplos de alimentos de cada tl y se recomienda el tamaño de las porciones. Use tazas y cucharas de medir para familiarizarse con la medida de erickson porción saludable.   Panes y féculas  Cada porción equivale a 15 gramos de carbohidratos.   · 1 rebanada de pan.   · ¼ de bagel.   · ¾ de taza de cereal frío (sin azúcar).   · ½ taza de cereal caliente o puré de john paul.   · 1 papa pequeña (tamaño de un mouse de ordenador).   ·  taza de pasta cocida o arroz.   · ½ muffin inglés.   · 1 taza de sopa a base de caldo.   · 3 tazas de palomitas de maíz.   · 4 a 6 galletas de gilbert integral.   · ½ taza de frijoles, guisantes o maíz cocidos.   Vegetales  Cada porción equivale a 5 gramos de carbohidratos.  · ½ taza de vegetales cocidos.   · 1 taza de vegetales crudos.   · ½ taza de jugo de tomate o verduras.   Frutas  Cada porción equivale a 15 gramos de carbohidratos.  · 1 manzana o naranja pequeña.   · 1 ¼ taza de sandía o fresas.   · ½ taza de puré de manzana (sin añadir azúcar).   · 2 cucharadas de pasas.   · ½  banana.   · ½ taza de fruta enlatada, envasadas   en agua, en bernardo propio jugo o endulzada con un sustituto del azúcar.   · ½ taza de jugo de fruta sin azúcar.   Lácteos:  Cada porción equivale a 7 a 15 gramos de carbohidratos.   · 1 taza de leche descremada.   · 6 oz (170 g) de yogur endulzado artificialmente o yogur natural.   · 1 taza de ricota baja en grasa.   · 1 taza de leche de soja.   · 1 taza de leche de almendras.   Anyi/proteínas  · 1 huevo red.   · 2 a 3 onzas (56 a 85 g) de carne, aves o pescado.   · ¼ de taza de queso cottage con bajo contenido de grasa.   · 1 cucharada de mantequilla de maní.   · 1 oz queso bajo en grasas.   · ¼ de taza de atún en agua.   · ½ taza de tofu.   Grasas:  · 1 cucharadita de aceite.   · 1 cucharadita de margarina sin grasa trans.   · 1 cucharadita de mantequilla.   · 1 cucharadita de mayonesa.   · 2 cucharadas de aguacate.   · 1 cucharada de aderezo para ensaladas.   · 1 cucharada de queso crema.   · 2 cucharadas de crema agria.   EJEMPLO DE DIETA DE 1800 CALORÍAS   Desayuno  · ¾ de taza de cereal sin azúcar (1 porción de carbohidratos).   · 1 taza de leche descremada (1 porción de carbohidratos).   · 1 rebanada de pan integral karey (1 porción de carbohidratos).   · ½ banana pequeña (1 porción de carbohidratos).   · 1 huevo revuelto.   · 1 cucharadita de margarina sin grasa trans.   Almuerzo:  · [place]ándwich de atún.   · 2 rebanadas de pan integral (2 porciones de carbohidratos).   · ½ taza de atún enlatado en agua, escurrido.   · 1 cucharada de mayonesa baja en grasa.   · 1 tallo de apio, ivelisse.   · 2 rebanadas de tomate.   · 1 hoja de lobito.   · 1 taza de bastones de zanahorias.   · 24 a 30 uvas sin semillas (2 porciones de carbohidratos).   · 6 oz (170 g) de yogur light (1 porción de carbohidratos).   Colación de media tarde  · 3 galletas danyel (1 porción de carbohidratos).   · Leche descremada, 1 taza (1 porción de carbohidratos).   · 1 cucharada de mantequilla de  maní.   Analisa  · 3 oz (80 gr) de salmón a la chance con 1 cucharadita de aceite.   · 1 taza de puré de john paul (2 porciones de carbohidratos) con 1 cucharadita de margarina sin grasa trans.   · 1 taza de judías verdes frescas o congeladas.   · 1 taza.   · 1 taza de leche descremada (1 porción de carbohidratos).   Colación de la noche  · 3 tazas de palomitas de maíz (1 porción de carbohidratos).   · con 2 cucharadas de queso parmesano.   PLAN DE COMIDAS   Puede utilizar esta hoja para realizar bernardo plan de comidas basándose en las indicaciones para la dieta de 1800 calorías. Si usted está usando velvet plan para ayudar a controlar bernardo glucosa en la yaz, es posible intercambiar alimentos que contienen carbohidratos (lácteos, féculas y frutas). Seleccione erickson variedad de alimentos frescos de diferentes colores y sabores. La cantidad total de carbohidratos en jeronimo comidas o meriendas es más importante que asegurarse de incluir todos los grupos de alimentos cada vez que come. Elija entre los siguientes alimentos para elaborar las comidas del día:   · 8 porciones de féculas.   · 4 porciones de vegetales.   · 3 porciones de frutas.   · 2 porciones de lácteos.   · 6 a 7 oz (168 a 196 g) de carne / proteínas.   · Hasta 4 porciones de grasas.   Bernardo dietista puede utilizar esta hoja de trabajo para ayudarle a decidir cuántas porciones y los tipos de alimentos que son perfectos para usted.   DESAYUNO  Mahendra de alimentos y porciones /elección de los alimentos  Féculas ________________________________________________________   Lácteos _________________________________________________________   Frutas _________________________________________________________   Anyi/proteínas ________________________________________________________   Grasas __________________________________________________________   ALMUERZO  Mahendra de alimentos y porciones  Féculas ________________________________________________________   Anyi/proteínas  ________________________________________________________   Vegetales ________________________________________________________   Fruta _________________________________________________________   Lácteos _________________________________________________________   Grasas __________________________________________________________   COLACIÓN DE LA NOCHE  Mahendra de alimentos y porciones  Féculas ________________________________________________________   Anyi/proteínas __________________________________________________   Frutas _________________________________________________________   Lácteos _________________________________________________________   SIVAKUMAR  Grupos de alimentos y porciones  Féculas ________________________________________________________   Anyi/proteínas __________________________________________________   Lácteos _________________________________________________________   Vegetales ________________________________________________________   Fruta _________________________________________________________   Grasas __________________________________________________________   COLACIÓN DE LA NOCHE  Mahendra de alimentos y porciones /elección de los alimentos  Frutas _________________________________________________________   Anyi/proteínas __________________________________________________   Lácteos _________________________________________________________   Féculas ________________________________________________________   TOTALES DIARIOS  Féculas ____________________________   Verduras _________________________   Frutas _____________________________   Lácteos_____________________________   Anyi/proteínas______________________   Grasas _______________________________   Document Released: 04/03/2008 Document Revised: 03/11/2013  ExitCare® Patient Information ©2013 [facility].1800 Calorie Diet for Diabetes Meal Planning  The 1800 calorie diet is designed for eating up to 1800 calories each day. Following  this diet and making healthy meal choices can help improve overall health. This diet controls blood sugar (glucose) levels and can also help lower blood pressure and cholesterol.  SERVING SIZES  Measuring foods and serving sizes helps to make sure you are getting the right amount of food. The list below tells how big or small some common serving sizes are:  · 1 oz.........4 stacked dice.   · 3 oz.........Deck of cards.   · 1 tsp........Tip of little finger.   · 1 tbs........Thumb.   · 2 tbs........Golf ball.   · ½ cup.......Half of a fist.   · 1 cup........A fist.   GUIDELINES FOR CHOOSING FOODS  The goal of this diet is to eat a variety of foods and limit calories to 1800 each day. This can be done by choosing foods that are low in calories and fat. The diet also suggests eating small amounts of food frequently. Doing this helps control your blood glucose levels so they do not get too high or too low. Each meal or snack may include a protein food source to help you feel more satisfied and to stabilize your blood glucose. Try to eat about the same amount of food around the same time each day. This includes weekend days, travel days, and days off work. Space your meals about 4 to 5 hours apart and add a snack between them if you wish.   For example, a daily food plan could include breakfast, a morning snack, lunch, dinner, and an evening snack. Healthy meals and snacks include whole grains, vegetables, fruits, lean meats, poultry, fish, and dairy products. As you plan your meals, select a variety of foods. Choose from the bread and starch, vegetable, fruit, dairy, and meat/protein groups. Examples of foods from each group and their suggested serving sizes are listed below. Use measuring cups and spoons to become familiar with what a healthy portion looks like.  Bread and Starch  Each serving equals 15 grams of carbohydrates.  · 1 slice bread.   · ¼ bagel.   · ¾ cup cold cereal (unsweetened).   · ½ cup hot cereal or  mashed potatoes.   · 1 small potato (size of a computer mouse).   ·  cup cooked pasta or rice.   · ½ English muffin.   · 1 cup broth-based soup.   · 3 cups of popcorn.   · 4 to 6 whole-wheat crackers.   · ½ cup cooked beans, peas, or corn.   Vegetable  Each serving equals 5 grams of carbohydrates.  · ½ cup cooked vegetables.   · 1 cup raw vegetables.   · ½ cup tomato or vegetable juice.   Fruit  Each serving equals 15 grams of carbohydrates.  · 1 small apple or orange.   · 1¼ cup watermelon or strawberries.   · ½ cup applesauce (no sugar added).   · 2 tbs raisins.   · ½ banana.   · ½ cup canned fruit, packed in water, its own juice, or sweetened with a sugar substitute.   · ½ cup unsweetened fruit juice.   Dairy  Each serving equals 12 to 15 grams of carbohydrates.  · 1 cup fat-free milk.   · 6 oz artificially sweetened yogurt or plain yogurt.   · 1 cup low-fat buttermilk.   · 1 cup soy milk.   · 1 cup almond milk.   Meat/Protein  · 1 large egg.   · 2 to 3 oz meat, poultry, or fish.   · ¼ cup low-fat cottage cheese.   · 1 tbs peanut butter.   · 1 oz low-fat cheese.   · ¼ cup tuna in water.   · ½ cup tofu.   Fat  · 1 tsp oil.   · 1 tsp trans-fat-free margarine.   · 1 tsp butter.   · 1 tsp mayonnaise.   · 2 tbs avocado.   · 1 tbs salad dressing.   · 1 tbs cream cheese.   · 2 tbs sour cream.   SAMPLE 1800 CALORIE DIET PLAN  Breakfast  · ¾ cup unsweetened cereal (1 carb serving).   · 1 cup fat-free milk (1 carb serving).   · 1 slice whole-wheat toast (1 carb serving).   · ½ small banana (1 carb serving).   · 1 scrambled egg.   · 1 tsp trans-fat-free margarine.   Lunch  · Tuna sandwich.   · 2 slices whole-wheat bread (2 carb servings).   · ½ cup canned tuna in water, drained.   · 1 tbs reduced fat mayonnaise.   · 1 stalk celery, chopped.   · 2 slices tomato.   · 1 lettuce leaf.   · 1 cup carrot sticks.   · 24 to 30 seedless grapes (2 carb servings).   · 6 oz light yogurt (1 carb serving).   Afternoon Snack  · 3 danyel  cracker squares (1 carb serving).   · Fat-free milk, 1 cup (1 carb serving).   · 1 tbs peanut butter.   Dinner  · 3 oz salmon, broiled with 1 tsp oil.   · 1 cup mashed potatoes (2 carb servings) with 1 tsp trans-fat-free margarine.   · 1 cup fresh or frozen green beans.   · 1 cup steamed asparagus.   · 1 cup fat-free milk (1 carb serving).   Evening Snack  · 3 cups air-popped popcorn (1 carb serving).   · 2 tbs parmesan cheese sprinkled on top.   MEAL PLAN  Use this worksheet to help you make a daily meal plan based on the 1800 calorie diet suggestions. If you are using this plan to help you control your blood glucose, you may interchange carbohydrate-containing foods (dairy, starches, and fruits). Select a variety of fresh foods of varying colors and flavors. The total amount of carbohydrate in your meals or snacks is more important than making sure you include all of the food groups every time you eat. Choose from the following foods to build your day's meals:  · 8 Starches.   · 4 Vegetables.   · 3 Fruits.   · 2 Dairy.   · 6 to 7 oz Meat/Protein.   · Up to 4 Fats.   Your dietician can use this worksheet to help you decide how many servings and which types of foods are right for you.  BREAKFAST  Food Group and Servings / Food Choice  Starch ________________________________________________________  Dairy _________________________________________________________  Fruit _________________________________________________________  Meat/Protein __________________________________________________  Fat ___________________________________________________________  LUNCH  Food Group and Servings / Food Choice  Starch ________________________________________________________  Meat/Protein __________________________________________________  Vegetable _____________________________________________________  Fruit _________________________________________________________  Dairy  _________________________________________________________  Fat ___________________________________________________________  AFTERNOON SNACK  Food Group and Servings / Food Choice  Starch ________________________________________________________  Meat/Protein __________________________________________________  Fruit __________________________________________________________  Dairy _________________________________________________________  DINNER  Food Group and Servings / Food Choice  Starch _________________________________________________________  Meat/Protein ___________________________________________________  Dairy __________________________________________________________  Vegetable ______________________________________________________  Fruit ___________________________________________________________  Fat ____________________________________________________________  EVENING SNACK  Food Group and Servings / Food Choice  Fruit __________________________________________________________  Meat/Protein ___________________________________________________  Dairy __________________________________________________________  Starch _________________________________________________________  DAILY TOTALS  Starch ____________________________  Vegetable _________________________  Fruit _____________________________  Dairy _____________________________  Meat/Protein______________________  Fat _______________________________  Document Released: 07/10/2006 Document Revised: 03/11/2013 Document Reviewed: 11/02/2012  ExitCare® Patient Information ©2013 [place], [facility].Recuento básico de carbohidratos para la diabetes mellitus  (Basic Carbohydrate Counting for Diabetes Mellitus)  El recuento de carbohidratos es un método destinado a calcular la cantidad de carbohidratos en la dieta. El consumo de carbohidratos aumenta naturalmente el nivel de azúcar (glucosa) en la yaz, por lo que es importante que sepa la cantidad que debe  incluir en cada comida. El recuento de carbohidratos ayuda a mantener el nivel de glucosa en la yaz dentro de los límites normales. La cantidad permitida de carbohidratos es diferente para cada persona. Un nutricionista puede ayudarlo a calcular la cantidad adecuada para usted. Erickson vez que sepa la cantidad de carbohidratos que puede consumir, podrá calcular los carbohidratos de los alimentos que desea comer.  Los siguientes alimentos incluyen carbohidratos:  · Granos, cruz panes y cereales.  · Frijoles secos y productos con soja.  · Vegetales almidonados, cruz john paul, guisantes y maíz.  · Frutas y jugos de frutas.  · Leche y yogur.  · Dulces y bocadillos, cruz pastel, galletas, caramelos, john paul fritas de bolsa, refrescos y bebidas frutales con azúcar.  RECUENTO DE CARBOHIDRATOS  Hay dos maneras de calcular los carbohidratos de los alimentos. Puede usar cualquiera de los dos métodos o erickson combinación de ambos.  Leer la etiqueta de información nutricional de los alimentos envasados  La información nutricional es erickson etiqueta incluida en keon todas las bebidas y los alimentos envasados de los [place]. Indica el tamaño de la porción de dank alimento o bebida e información sobre los nutrientes de cada porción, incluso los gramos (g) de carbohidratos por porción.   Decida la cantidad de porciones que comerá o tomará de velvet alimento o bebida. Multiplique la cantidad de porciones por el número de gramos de carbohidratos indicados en la etiqueta para zaira porción. El total será la cantidad de carbohidratos que consumirá al comer dank alimento o bean zaira bebida.  Conocer las porciones estándar de los alimentos  Cuando coma alimentos no envasados o que no incluyan la información nutricional en la etiqueta, deberá medir las porciones para poder calcular la cantidad de carbohidratos. Erickson porción de la mayoría de los alimentos ricos en carbohidratos contiene alrededor de 15 g de carbohidratos. La siguiente lista incluye  los tamaños de porción de los alimentos ricos en carbohidratos que contienen alrededor de 15 g de carbohidratos por porción:   · 1 rebanada de pan (1 oz) o 1 tortilla de seis pulgadas.  · ½ panecillo de hamburguesa o bollito tipo inglés.  · 4 a 6 galletas.  · ¾ de taza de cereal sin azúcar y seco.  · ½ taza de cereal caliente.  ·  de taza de arroz o pastas.  · ½ taza de puré de john paul o ¼ de erickson papa red al horno.  · 1 taza de frutas frescas o erickson fruta pequeña.  · ½ taza de frutas o jugo de frutas enlatados o congelados.  · 1 taza de leche.  ·   de taza de yogur descremado sin ningún agregado o de yogur endulzado con edulcorante artificial.  · ½ taza de vegetales almidonados, cruz guisantes, maíz o john paul, o de frijoles secos cocidos.  Decida la cantidad de porciones estándar que comerá. Multiplique la cantidad de porciones por 15 (los gramos de carbohidratos en zaira porción). Por ejemplo, si come 2 tazas de fresas, habrá comido 2 porciones y 30 g de carbohidratos (2 porciones x 15 g = 30 g). Para las comidas cruz sopas y guisos, en las que se mezcla más de un alimento, deberá contar los carbohidratos de cada alimento incluido.  EJEMPLO DE RECUENTO DE CARBOHIDRATOS  Ejemplo de omar  · 3 onzas de pechugas de cheryl.  ·  de taza de arroz integral.  · ½ taza de maíz.  · 1 taza de leche.  · 1 taza de fresas con crema batida sin azúcar.  Cálculo de carbohidratos  Paso 1: Identifique los alimentos que contienen carbohidratos:   · Arroz.  · Maíz.  · Leche.  · Fresas.  Paso 2: Calcule el número de porciones que consumirá de cada cl:   · 2 porciones de arroz.  · 1 porción de maíz.  · 1 porción de leche.  · 1 porción de fresas.  Paso 3: Multiplique cada erickson de esas porciones por 15 g:   · 2 porciones de arroz x 15 g = 30 g.  · 1 porción de maíz x 15 g = 15 g.  · 1 porción de leche x 15 g = 15 g.  · 1 porción de fresas x 15 g = 15 g.  Paso 4: Sume todas las cantidades para conocer el total de gramos de carbohidratos  "consumidos: 30 g + 15 g + 15 g + 15 g = 75 g.     Esta información no tiene cruz fin reemplazar el consejo del médico. Asegúrese de hacerle al médico cualquier pregunta que tenga.     Document Released: 03/11/2013 Document Revised: 01/08/2016  [employer] Patient Education ©2016 [employer]  Basic Carbohydrate Counting for Diabetes Mellitus  Carbohydrate counting is a method for keeping track of the amount of carbohydrates you eat. Eating carbohydrates naturally increases the level of sugar (glucose) in your blood, so it is important for you to know the amount that is okay for you to have in every meal. Carbohydrate counting helps keep the level of glucose in your blood within normal limits. The amount of carbohydrates allowed is different for every person. A dietitian can help you calculate the amount that is right for you. Once you know the amount of carbohydrates you can have, you can count the carbohydrates in the foods you want to eat.  Carbohydrates are found in the following foods:  · Grains, such as breads and cereals.  · Dried beans and soy products.  · Starchy vegetables, such as potatoes, peas, and corn.  · Fruit and fruit juices.  · Milk and yogurt.  · Sweets and snack foods, such as cake, cookies, candy, chips, soft drinks, and fruit drinks.  CARBOHYDRATE COUNTING  There are two ways to count the carbohydrates in your food. You can use either of the methods or a combination of both.  Reading the \"Nutrition Facts\" on Packaged Food  The \"Nutrition Facts\" is an area that is included on the labels of almost all packaged food and beverages in the [place]. It includes the serving size of that food or beverage and information about the nutrients in each serving of the food, including the grams (g) of carbohydrate per serving.   Decide the number of servings of this food or beverage that you will be able to eat or drink. Multiply that number of servings by the number of grams of carbohydrate " "that is listed on the label for that serving. The total will be the amount of carbohydrates you will be having when you eat or drink this food or beverage.  Learning Standard Serving Sizes of Food  When you eat food that is not packaged or does not include \"Nutrition Facts\" on the label, you need to measure the servings in order to count the amount of carbohydrates. A serving of most carbohydrate-rich foods contains about 15 g of carbohydrates. The following list includes serving sizes of carbohydrate-rich foods that provide 15 g of carbohydrate per serving:    · 1 slice of bread (1 oz) or 1 six-inch tortilla.    · ½ of a hamburger bun or English muffin.  · 4-6 crackers.  · ¾ cup unsweetened dry cereal.    · ½ cup hot cereal.  ·  cup rice or pasta.    · ½ cup mashed potatoes or ¼ of a large baked potato.  · 1 cup fresh fruit or one small piece of fruit.    · ½ cup canned or frozen fruit or fruit juice.  · 1 cup milk.  ·  cup plain fat-free yogurt or yogurt sweetened with artificial sweeteners.  · ½ cup cooked dried beans or starchy vegetable, such as peas, corn, or potatoes.    Decide the number of standard-size servings that you will eat. Multiply that number of servings by 15 (the grams of carbohydrates in that serving). For example, if you eat 2 cups of strawberries, you will have eaten 2 servings and 30 g of carbohydrates (2 servings x 15 g = 30 g). For foods such as soups and casseroles, in which more than one food is mixed in, you will need to count the carbohydrates in each food that is included.  EXAMPLE OF CARBOHYDRATE COUNTING  Sample Dinner   · 3 oz chicken breast.  ·  cup of brown rice.  · ½ cup of corn.  · 1 cup milk.    · 1 cup strawberries with sugar-free whipped topping.    Carbohydrate Calculation   Step 1: Identify the foods that contain carbohydrates:   · Rice.    · Corn.    · Milk.    · Strawberries.  Step 2: Calculate the number of servings eaten of each:   · 2 servings of rice.    · 1 serving " of corn.    · 1 serving of milk.    · 1 serving of strawberries.  Step 3:  Multiply each of those number of servings by 15 g:   · 2 servings of rice x 15 g = 30 g.    · 1 serving of corn x 15 g = 15 g.    · 1 serving of milk x 15 g = 15 g.    · 1 serving of strawberries x 15 g = 15 g.  Step 4: Add together all of the amounts to find the total grams of carbohydrates eaten: 30 g + 15 g + 15 g + 15 g = 75 g.     This information is not intended to replace advice given to you by your health care provider. Make sure you discuss any questions you have with your health care provider.     Document Released: 12/18/2006 Document Revised: 01/08/2016 Document Reviewed: 11/14/2014  Elsevier Interactive Patient Education ©2016 Elsevier Inc.

## 2019-10-07 NOTE — LETTER
October 7, 2019          To whom it may concern;      Joellen Wakefield suffers from vitamin D deficiency. Therefore, it is medically necessary to have her vitamin D levels checked. If you have any questions, Please feel free to reach out to me.                    Argelia Rivera MD

## 2020-01-21 DIAGNOSIS — E55.9 VITAMIN D DEFICIENCY: ICD-10-CM

## 2020-01-21 RX ORDER — ERGOCALCIFEROL 1.25 MG/1
CAPSULE ORAL
Qty: 12 CAP | Refills: 0 | Status: SHIPPED | OUTPATIENT
Start: 2020-01-21 | End: 2022-01-16

## 2020-01-21 NOTE — TELEPHONE ENCOUNTER
Was the patient seen in the last year in this department? Yes    Does patient have an active prescription for medications requested? No     Received Request Via: Pharmacy      Pt met protocol?: Yes,, labs 6/19 ov 10/19      Ref Range & Units 7mo ago   25-Hydroxy   Vitamin D 25 30.0 - 100.0 ng/mL 17.1Low

## 2020-04-22 DIAGNOSIS — K21.9 GASTROESOPHAGEAL REFLUX DISEASE WITHOUT ESOPHAGITIS: ICD-10-CM

## 2020-04-22 NOTE — TELEPHONE ENCOUNTER
Was the patient seen in the last year in this department? Yes    Does patient have an active prescription for medications requested? No     Received Request Via: Pharmacy      Pt met protocol?: Yes   Pt last ov 10/2019   Lab Results   Component Value Date/Time    SODIUM 141 06/01/2019 08:57 AM    POTASSIUM 4.3 06/01/2019 08:57 AM    CHLORIDE 106 06/01/2019 08:57 AM    CO2 20 06/01/2019 08:57 AM    GLUCOSE 102 (H) 06/01/2019 08:57 AM    BUN 11 06/01/2019 08:57 AM    CREATININE 0.66 06/01/2019 08:57 AM    BUNCREATRAT 17 06/01/2019 08:57 AM

## 2020-04-23 RX ORDER — OMEPRAZOLE 20 MG/1
CAPSULE, DELAYED RELEASE ORAL
Qty: 90 CAP | Refills: 1 | Status: SHIPPED | OUTPATIENT
Start: 2020-04-23 | End: 2020-10-15

## 2020-08-20 ENCOUNTER — OFFICE VISIT (OUTPATIENT)
Dept: URGENT CARE | Facility: PHYSICIAN GROUP | Age: 40
End: 2020-08-20
Payer: COMMERCIAL

## 2020-08-20 VITALS
DIASTOLIC BLOOD PRESSURE: 78 MMHG | OXYGEN SATURATION: 96 % | WEIGHT: 156 LBS | BODY MASS INDEX: 28.71 KG/M2 | HEART RATE: 71 BPM | SYSTOLIC BLOOD PRESSURE: 108 MMHG | HEIGHT: 62 IN | RESPIRATION RATE: 16 BRPM | TEMPERATURE: 97.7 F

## 2020-08-20 DIAGNOSIS — B02.9 HERPES ZOSTER WITHOUT COMPLICATION: ICD-10-CM

## 2020-08-20 PROCEDURE — 99214 OFFICE O/P EST MOD 30 MIN: CPT | Performed by: PHYSICIAN ASSISTANT

## 2020-08-20 RX ORDER — VALACYCLOVIR HYDROCHLORIDE 1 G/1
1000 TABLET, FILM COATED ORAL 3 TIMES DAILY
Qty: 21 TAB | Refills: 0 | Status: SHIPPED | OUTPATIENT
Start: 2020-08-20 | End: 2020-08-27

## 2020-08-20 ASSESSMENT — FIBROSIS 4 INDEX: FIB4 SCORE: 0.51

## 2020-08-21 NOTE — PATIENT INSTRUCTIONS
Culebrilla  Shingles    La culebrilla, también conocida cruz herpes zóster, es erickson infección que causa erickson erupción cutánea dolorosa y ampollas llenas de líquido. La causa un virus.  La culebrilla solo se desarrolla en personas que:  · Hernandez tenido varicela.  · Hernandez recibido un medicamento para protegerse de la varicela (hernandez sido vacunadas). La culebrilla es poco frecuente en dank tl.  ¿Cuáles son las causas?  El virus de la varicela zóster (VVZ) ocasiona la culebrilla. Velvet es el mismo virus que causa la varicela. Después de la exposición al VVZ, el virus permanece en el cuerpo en un estado inactivo (latente). La culebrilla se desarrolla si el virus se reactiva. [place] puede ocurrir muchos años después de la primera exposición (inicial) al VVZ. No se sabe qué causa la reactivación de velvet virus.  ¿Qué incrementa el riesgo?  Las personas que hernandez tenido varicela o hernandez recibido la vacuna contra la varicela, están en riesgo de tener culebrilla. La infección de la culebrilla es más frecuente en las personas que:  · Son mayores de 60 años de edad.  · Tienen debilitado el sistema que combate las enfermedades (sistemainmunitario), por ejemplo, las personas que tienen:  ? VIH.  ? Síndrome de inmunodeficiencia adquirida (sida).  ? Cáncer.  · Reciben medicamentos que debilitan el sistema inmunitario, cruz los medicamentos que se indican en ab de trasplantes.  · Está experimentando mucho estrés.  ¿Cuáles son los signos o los síntomas?  Los síntomas tempranos de esta afección incluyen picazón, hormigueo y dolor en un área de la piel. Velvet dolor se puede describir cruz ardor, punzante o pulsátil.  Unos días o semanas después de que comienzan los primeros síntomas, aparece erickson erupción cutánea rojiza y dolorosa. La erupción generalmente aparece en un lado del cuerpo en un patrón de bandas o semejante a erickson franja. Con el tiempo, la erupción cutánea se convierte en ampollas llenas de líquido que se rompen, se transforman en costras  y se secan en el término de 2 a 3 semanas.  En cualquier momento jessica la infección, usted también puede presentar:  · Fiebre.  · Escalofríos.  · Dolor de luna.  · Malestar estomacal.  ¿Cómo se diagnostica?  Esta afección se diagnostica con un examen cutáneo. Es posible que se extraigan muestras de piel o líquido de las ampollas antes de realizar un diagnóstico. Estas muestras se examinan bajo un microscopio o se envían a un laboratorio para bernardo análisis.  ¿Cómo se trata?  La erupción cutánea puede durar varias semanas. No hay erickson scot específica para esta afección. Probablemente, bernardo médico le recetará medicamentos para ayudarlo a controlar el dolor, recuperarse más rápido y evitar problemas a brisa plazo. Entre los medicamentos se incluyen los siguientes:  · Medicamentos antivirales.  · Fármacos antiinflamatorios.  · Analgésicos.  · Medicamentos para aliviar la picazón (antihistamínicos).  Si la marycruz afectada está en el kyung, se lo puede derivar a un especialista, cruz un médico especialista en ojos (oftalmólogo) o en oídos, nariz y garganta (otorrinolaringólogo) para ayudarlo a evitar problemas oculares, dolor crónico o discapacidad.  Siga estas indicaciones en bernardo casa:  Medicamentos  · Beaman los medicamentos de venta cecil y los recetados solamente cruz se lo haya indicado el médico.  · Aplique erickson crema para calmar la picazón o cremas anestésicas en la marycruz afectada según las indicaciones del médico.  Para aliviar la picazón y las molestias    · Aplique paños húmedos y fríos (compresas frías) sobre la marycruz de la erupción cutánea o las ampollas siguiendo las indicaciones del médico.  · Los donnie con agua fría pueden brindar alivio. Pruebe agregar bicarbonato de sodio o padilla seca en el agua para reducir la picazón. No se bañe con [place].  Cuidado de las ampollas y la erupción cutánea  · Mantenga la marycruz de la erupción cutánea cubierta con erickson venda floja (vendaje). Use ropa holgada para ayudar a  aliviar el dolor provocado por el roce con la erupción.  · Mantenga la erupción y las ampollas limpias lavando la marycruz con jabón suave y agua fría según las indicaciones del médico.  · Verifique la erupción cutánea todos los días para detectar signos de infección. Esté atento a los siguientes signos:  ? Aumento del enrojecimiento, la hinchazón o el dolor.  ? Líquido o yaz.  ? Calor.  ? Pus o mal olor.  · No se rasque en la marycruz de la erupción ni se toque las ampollas. Para ayudar a evitar rascarse:  ? Tenga las uñas siempre cortas y limpias.  ? Use guantes o mitones mientras duerme, si no puede dejar de rascarse.  Instrucciones generales  · Shu reposo cruz se lo haya indicado el médico.  · Concurra a todas las visitas de seguimiento cruz se lo haya indicado el médico. [place] es importante.  · Lávese las pop frecuentemente con agua y jabón. Use desinfectante para pop si no dispone de agua y jabón. Al hacerlo, reduce jeronimo probabilidades de contraer erickson infección bacteriana en la piel.  · Antes de que se forme erickson costra sobre las ampollas, la infección de la culebrilla puede causar varicela en las personas que nunca la tuvieron o nunca se vacunaron contra la varicela. Para impedir que esto ocurra, evite el contacto con otras personas, especialmente:  ? Los bebés.  ? Las mujeres embarazadas.  ? Los niños con eczema.  ? Las personas mayores con trasplantes.  ? Las personas con enfermedades crónicas, cruz cáncer o síndrome de inmunodeficiencia adquirida (SIDA).  Comuníquese con un médico si:  · El dolor no se shane con los medicamentos que le recetaron.  · El dolor no mejora después de que se scot la erupción cutánea.  · Tiene signos de infección en la marycruz de la erupción cutánea, tales cruz los siguientes:  ? Aumento del enrojecimiento, la hinchazón o el dolor alrededor de la erupción.  ? Presenta líquido o yaz que supura de la erupción.  ? La marycruz de la erupción se siente caliente al tacto.  ? Advierte pus o  mal olor que sale de la erupción.  Solicite ayuda de inmediato si:  · La erupción cutánea está en el kyung o en la nariz.  · Tiene dolor en el kyung, en la marycruz de los ojos o presenta pérdida de sensibilidad en un lado del kyung.  · Tiene dificultad para irma.  · Siente dolor o zumbido en los oídos.  · Presenta pérdida del gusto.  · La afección empeora.  Resumen  · La culebrilla, también conocida cruz herpes zóster, es erickson infección que causa erickson erupción cutánea dolorosa y ampollas llenas de líquido.  · Esta afección se diagnostica con un examen cutáneo. Es posible que se extraigan y examinen muestras de piel o líquido de las ampollas antes de realizar un diagnóstico.  · Mantenga la marycruz de la erupción cutánea cubierta con erickson venda floja (vendaje). Use ropa holgada para ayudar a aliviar el dolor provocado por el roce con la erupción.  · Antes de que se forme erickson costra sobre las ampollas, la infección de la culebrilla puede causar varicela en las personas que nunca la tuvieron o nunca se vacunaron contra la varicela.  Esta información no tiene cruz fin reemplazar el consejo del médico. Asegúrese de hacerle al médico cualquier pregunta que tenga.  Document Released: 09/27/2006 Document Revised: 11/02/2018 Document Reviewed: 11/02/2018  Elsevier Patient Education © 2020 Elsevier Inc.

## 2020-08-21 NOTE — PROGRESS NOTES
"Subjective:   Joellen Wakefield  is a 39 y.o. female who presents for Rash (Rash along R. Side x 5 days)    This is a new problem.  Patient presents to urgent care with 5-day history of rash to the left side of her torso as well as 2 small red bumps on her left leg.  Patient denies any known insect bites, changes in soaps, lotions, creams, detergents or medications.  The rash on the torso is somewhat burning, stinging and occasional sharp stabbing pains.  Patient has been applying Benadryl topically to the area with no improvement.    Denies any recent illness or increase in stressors.      Rash  Pertinent negatives include no congestion, cough or fever.     Review of Systems   Constitutional: Negative for chills, fever and malaise/fatigue.   HENT: Negative for congestion.    Respiratory: Negative for cough.    Skin: Positive for itching and rash.   All other systems reviewed and are negative.    No Known Allergies  Reviewed past medical, surgical , social and family history.  Reviewed prescription and over-the-counter medications with patient and electronic health record today.     Objective:   /78 (BP Location: Left arm, Patient Position: Sitting, BP Cuff Size: Adult)   Pulse 71   Temp 36.5 °C (97.7 °F) (Temporal)   Resp 16   Ht 1.575 m (5' 2\") Comment: Per Pet  Wt 70.8 kg (156 lb)   SpO2 96%   BMI 28.53 kg/m²   Physical Exam  Vitals signs reviewed.   Constitutional:       General: She is not in acute distress.     Appearance: She is well-developed. She is not ill-appearing or toxic-appearing.   HENT:      Head: Normocephalic and atraumatic.      Right Ear: External ear normal.      Left Ear: External ear normal.      Nose: Nose normal.      Mouth/Throat:      Lips: Pink. No lesions.      Mouth: Mucous membranes are moist.      Pharynx: Oropharynx is clear. Uvula midline. No oropharyngeal exudate.   Eyes:      General: Lids are normal.      Extraocular Movements: Extraocular movements intact.   "   Conjunctiva/sclera: Conjunctivae normal.      Pupils: Pupils are equal, round, and reactive to light.   Neck:      Musculoskeletal: Normal range of motion and neck supple.   Cardiovascular:      Rate and Rhythm: Normal rate and regular rhythm.      Heart sounds: Normal heart sounds. No murmur. No friction rub. No gallop.    Pulmonary:      Effort: Pulmonary effort is normal. No respiratory distress.      Breath sounds: Normal breath sounds.   Abdominal:      General: Bowel sounds are normal. There is no distension.      Palpations: Abdomen is soft. There is no mass.      Tenderness: There is no abdominal tenderness. There is no guarding or rebound.   Musculoskeletal: Normal range of motion.         General: No tenderness or deformity.   Lymphadenopathy:      Head:      Right side of head: No submental, submandibular or tonsillar adenopathy.      Left side of head: No submental, submandibular or tonsillar adenopathy.      Cervical: No cervical adenopathy.      Upper Body:      Right upper body: No supraclavicular adenopathy.      Left upper body: No supraclavicular adenopathy.   Skin:     General: Skin is warm and dry.      Findings: No rash.             Comments: Left side of torso along the T8 dermatome with patches of erythematous base with vesicular lesions.  Left lower extremity with 2 small erythematous papules less than 5 mm consistent with likely insect bites   Neurological:      Mental Status: She is alert and oriented to person, place, and time.      Cranial Nerves: Cranial nerves are intact. No cranial nerve deficit.      Sensory: Sensation is intact. No sensory deficit.      Motor: Motor function is intact.      Coordination: Coordination is intact. Coordination normal.      Gait: Gait is intact.   Psychiatric:         Attention and Perception: Attention normal.         Mood and Affect: Mood and affect normal.         Speech: Speech normal.         Behavior: Behavior normal. Behavior is cooperative.           Thought Content: Thought content normal.         Judgment: Judgment normal.           Assessment/Plan:   1. Herpes zoster without complication  - valacyclovir (VALTREX) 1 GM Tab; Take 1 Tab by mouth 3 times a day for 7 days.  Dispense: 21 Tab; Refill: 0    Discussed with patient that given the fact she has had this for 5 days that antiviral is likely not to be helpful.  However, patient desires to at least give this a try.  She is given Valtrex as above.  Printed information with patient education regarding shingles in [place] is given to the patient.  Contagion reviewed.    Upon entering exam room I ensured patient was wearing a mask.  This provider wore appropriate PPE throughout entire visit.  Patient wore mask entire visit except for a brief period while examining oropharynx.    Differential diagnosis, natural history, supportive care, and indications for immediate follow-up discussed.     Red flag warning symptoms and strict ER/follow-up precautions given.  The patient demonstrated a good understanding and agreed with the treatment plan.  Please note that this note was created using voice recognition speech to text software. Every effort has been made to correct obvious errors.  However, I expect there are errors of grammar and possibly context that were not discovered prior to finalizing the note  DEVONTE Patel PA-C

## 2020-08-22 ASSESSMENT — ENCOUNTER SYMPTOMS
FEVER: 0
CHILLS: 0
COUGH: 0

## 2020-10-14 DIAGNOSIS — K21.9 GASTROESOPHAGEAL REFLUX DISEASE WITHOUT ESOPHAGITIS: ICD-10-CM

## 2020-10-15 RX ORDER — OMEPRAZOLE 20 MG/1
CAPSULE, DELAYED RELEASE ORAL
Qty: 90 CAP | Refills: 0 | Status: SHIPPED | OUTPATIENT
Start: 2020-10-15 | End: 2022-01-16

## 2021-07-27 ENCOUNTER — HOSPITAL ENCOUNTER (OUTPATIENT)
Dept: LAB | Facility: MEDICAL CENTER | Age: 41
End: 2021-07-27
Attending: NURSE PRACTITIONER
Payer: COMMERCIAL

## 2021-07-27 LAB
BASOPHILS # BLD AUTO: 0.5 % (ref 0–1.8)
BASOPHILS # BLD: 0.03 K/UL (ref 0–0.12)
EOSINOPHIL # BLD AUTO: 0.12 K/UL (ref 0–0.51)
EOSINOPHIL NFR BLD: 2 % (ref 0–6.9)
ERYTHROCYTE [DISTWIDTH] IN BLOOD BY AUTOMATED COUNT: 39.3 FL (ref 35.9–50)
EST. AVERAGE GLUCOSE BLD GHB EST-MCNC: 120 MG/DL
HBA1C MFR BLD: 5.8 % (ref 4–5.6)
HCT VFR BLD AUTO: 40.4 % (ref 37–47)
HGB BLD-MCNC: 13.1 G/DL (ref 12–16)
IMM GRANULOCYTES # BLD AUTO: 0.02 K/UL (ref 0–0.11)
IMM GRANULOCYTES NFR BLD AUTO: 0.3 % (ref 0–0.9)
LYMPHOCYTES # BLD AUTO: 2.5 K/UL (ref 1–4.8)
LYMPHOCYTES NFR BLD: 41.5 % (ref 22–41)
MCH RBC QN AUTO: 27.8 PG (ref 27–33)
MCHC RBC AUTO-ENTMCNC: 32.4 G/DL (ref 33.6–35)
MCV RBC AUTO: 85.8 FL (ref 81.4–97.8)
MONOCYTES # BLD AUTO: 0.42 K/UL (ref 0–0.85)
MONOCYTES NFR BLD AUTO: 7 % (ref 0–13.4)
NEUTROPHILS # BLD AUTO: 2.93 K/UL (ref 2–7.15)
NEUTROPHILS NFR BLD: 48.7 % (ref 44–72)
NRBC # BLD AUTO: 0 K/UL
NRBC BLD-RTO: 0 /100 WBC
PLATELET # BLD AUTO: 279 K/UL (ref 164–446)
PMV BLD AUTO: 11.3 FL (ref 9–12.9)
RBC # BLD AUTO: 4.71 M/UL (ref 4.2–5.4)
WBC # BLD AUTO: 6 K/UL (ref 4.8–10.8)

## 2021-07-27 PROCEDURE — 80053 COMPREHEN METABOLIC PANEL: CPT

## 2021-07-27 PROCEDURE — 84443 ASSAY THYROID STIM HORMONE: CPT

## 2021-07-27 PROCEDURE — 83036 HEMOGLOBIN GLYCOSYLATED A1C: CPT

## 2021-07-27 PROCEDURE — 80061 LIPID PANEL: CPT

## 2021-07-27 PROCEDURE — 86803 HEPATITIS C AB TEST: CPT

## 2021-07-27 PROCEDURE — 36415 COLL VENOUS BLD VENIPUNCTURE: CPT

## 2021-07-27 PROCEDURE — 85025 COMPLETE CBC W/AUTO DIFF WBC: CPT

## 2021-07-28 LAB
ALBUMIN SERPL BCP-MCNC: 4.1 G/DL (ref 3.2–4.9)
ALBUMIN/GLOB SERPL: 1.4 G/DL
ALP SERPL-CCNC: 82 U/L (ref 30–99)
ALT SERPL-CCNC: 25 U/L (ref 2–50)
ANION GAP SERPL CALC-SCNC: 10 MMOL/L (ref 7–16)
AST SERPL-CCNC: 23 U/L (ref 12–45)
BILIRUB SERPL-MCNC: 0.3 MG/DL (ref 0.1–1.5)
BUN SERPL-MCNC: 11 MG/DL (ref 8–22)
CALCIUM SERPL-MCNC: 8.9 MG/DL (ref 8.5–10.5)
CHLORIDE SERPL-SCNC: 106 MMOL/L (ref 96–112)
CHOLEST SERPL-MCNC: 197 MG/DL (ref 100–199)
CO2 SERPL-SCNC: 23 MMOL/L (ref 20–33)
CREAT SERPL-MCNC: 0.6 MG/DL (ref 0.5–1.4)
FASTING STATUS PATIENT QL REPORTED: NORMAL
GLOBULIN SER CALC-MCNC: 3 G/DL (ref 1.9–3.5)
GLUCOSE SERPL-MCNC: 102 MG/DL (ref 65–99)
HCV AB SER QL: NORMAL
HDLC SERPL-MCNC: 34 MG/DL
LDLC SERPL CALC-MCNC: 120 MG/DL
POTASSIUM SERPL-SCNC: 4.3 MMOL/L (ref 3.6–5.5)
PROT SERPL-MCNC: 7.1 G/DL (ref 6–8.2)
SODIUM SERPL-SCNC: 139 MMOL/L (ref 135–145)
TRIGL SERPL-MCNC: 217 MG/DL (ref 0–149)
TSH SERPL DL<=0.005 MIU/L-ACNC: 2.65 UIU/ML (ref 0.38–5.33)

## 2021-12-28 ENCOUNTER — HOSPITAL ENCOUNTER (OUTPATIENT)
Dept: LAB | Facility: MEDICAL CENTER | Age: 41
End: 2021-12-28
Attending: NURSE PRACTITIONER
Payer: COMMERCIAL

## 2021-12-28 LAB
CHOLEST SERPL-MCNC: 222 MG/DL (ref 100–199)
EST. AVERAGE GLUCOSE BLD GHB EST-MCNC: 131 MG/DL
FASTING STATUS PATIENT QL REPORTED: NORMAL
HBA1C MFR BLD: 6.2 % (ref 4–5.6)
HDLC SERPL-MCNC: 41 MG/DL
LDLC SERPL CALC-MCNC: 137 MG/DL
TRIGL SERPL-MCNC: 222 MG/DL (ref 0–149)

## 2021-12-28 PROCEDURE — 83036 HEMOGLOBIN GLYCOSYLATED A1C: CPT

## 2021-12-28 PROCEDURE — 36415 COLL VENOUS BLD VENIPUNCTURE: CPT

## 2021-12-28 PROCEDURE — 80061 LIPID PANEL: CPT

## 2022-01-16 ENCOUNTER — APPOINTMENT (OUTPATIENT)
Dept: RADIOLOGY | Facility: MEDICAL CENTER | Age: 42
End: 2022-01-16
Attending: EMERGENCY MEDICINE
Payer: COMMERCIAL

## 2022-01-16 ENCOUNTER — HOSPITAL ENCOUNTER (EMERGENCY)
Facility: MEDICAL CENTER | Age: 42
End: 2022-01-16
Attending: EMERGENCY MEDICINE
Payer: COMMERCIAL

## 2022-01-16 VITALS
HEIGHT: 62 IN | DIASTOLIC BLOOD PRESSURE: 81 MMHG | SYSTOLIC BLOOD PRESSURE: 140 MMHG | HEART RATE: 84 BPM | BODY MASS INDEX: 27.59 KG/M2 | TEMPERATURE: 97.4 F | OXYGEN SATURATION: 98 % | WEIGHT: 149.91 LBS | RESPIRATION RATE: 16 BRPM

## 2022-01-16 DIAGNOSIS — G51.0 BELL'S PALSY: ICD-10-CM

## 2022-01-16 PROCEDURE — 99283 EMERGENCY DEPT VISIT LOW MDM: CPT

## 2022-01-16 PROCEDURE — A9270 NON-COVERED ITEM OR SERVICE: HCPCS | Performed by: EMERGENCY MEDICINE

## 2022-01-16 PROCEDURE — 70450 CT HEAD/BRAIN W/O DYE: CPT

## 2022-01-16 PROCEDURE — 700102 HCHG RX REV CODE 250 W/ 637 OVERRIDE(OP): Performed by: EMERGENCY MEDICINE

## 2022-01-16 RX ORDER — PREDNISONE 20 MG/1
60 TABLET ORAL DAILY
Qty: 21 TABLET | Refills: 0 | Status: SHIPPED | OUTPATIENT
Start: 2022-01-16 | End: 2022-01-23

## 2022-01-16 RX ORDER — POLYVINYL ALCOHOL 14 MG/ML
1 SOLUTION/ DROPS OPHTHALMIC EVERY 4 HOURS
Qty: 30 ML | Refills: 0 | Status: SHIPPED | OUTPATIENT
Start: 2022-01-16 | End: 2022-09-07

## 2022-01-16 RX ORDER — VALACYCLOVIR HYDROCHLORIDE 1 G/1
1000 TABLET, FILM COATED ORAL 3 TIMES DAILY
Qty: 21 TABLET | Refills: 0 | Status: SHIPPED | OUTPATIENT
Start: 2022-01-16 | End: 2022-01-23

## 2022-01-16 RX ADMIN — MINERAL OIL, PETROLATUM 1 APPLICATION: 425; 573 OINTMENT OPHTHALMIC at 15:54

## 2022-01-16 ASSESSMENT — FIBROSIS 4 INDEX: FIB4 SCORE: 0.71

## 2022-01-16 NOTE — ED TRIAGE NOTES
"Presents complaining of left eye redness, with drainage, and left facial tingling recurring since yesterday.  She denies focal weakness, or speech abnormality, exhibiting Bell's Palsy symptomatology.  She is referred to our facility from [facility] for further evaluation and management.    Chief Complaint   Patient presents with   • Eye Drainage   • Other         /99   Pulse 89   Temp 36.4 °C (97.6 °F) (Temporal)   Resp 20   Ht 1.575 m (5' 2\")   Wt 68 kg (149 lb 14.6 oz)   LMP 01/09/2022 (Approximate)   SpO2 99%   BMI 27.42 kg/m²    Has this patient been vaccinated for COVID YES  If not, would they like to be vaccinated while in the ER if eligible?  N/A  Would the patient like to speak with the ERP about the possibility of receiving the COVID vaccine today before making a decision? N/A         "

## 2022-01-16 NOTE — ED NOTES
Pt. Reports noticing L upper and lower facial paralysis since last night at approx 2100. Denies dizziness, confusion. Reports noticing some discomfort to posterior L neck. Reports difficulty closing L eye, and feels that her eye is dry. Denies other complaints at this time.

## 2022-01-16 NOTE — ED PROVIDER NOTES
ED Provider Note    CHIEF COMPLAINT  Chief Complaint   Patient presents with   • Eye Drainage   • Other       HPI  Joellen Wakefield is a 41 y.o. female who presents with chief complaint of facial weakness.  Patient reports that he woke up with left-sided facial weakness 2 days ago, she reports that since then has become more dense.  She denies any associated difficulty ambulating, weakness or numbness in her upper or lower extremities otherwise. She denies any dizziness.  Patient denies any associated fevers or chills or constitutional symptoms.  Patient denies any difficulty with ADLs.  She has not been dropping anything recently.  She does notice that when she is eating some of the food sneaks out the left side of her face.  She also has had trouble blinking.  Patient denies any associated back pain or neck pain.  Patient denies any difficulty speaking.    REVIEW OF SYSTEMS  ROS  See HPI for further details. All other systems are negative.     PAST MEDICAL HISTORY   has a past medical history of Allergy, Cardiac murmur (3/20/2019), Cold, Cold, Fatty liver, GERD (gastroesophageal reflux disease), Heart burn, Hyperlipidemia, Indigestion, and Nasal polyp.    SOCIAL HISTORY  Social History     Tobacco Use   • Smoking status: Never Smoker   • Smokeless tobacco: Never Used   Vaping Use   • Vaping Use: Never used   Substance and Sexual Activity   • Alcohol use: No     Alcohol/week: 0.0 oz   • Drug use: No   • Sexual activity: Yes     Partners: Male     Birth control/protection: Surgical     Comment: Lives with her kids and boyfriend. two girls 19,16, and 10 and one boy. Works full time web planner. No social or domestic concerns.       SURGICAL HISTORY   has a past surgical history that includes other orthopedic surgery and tubal coagulation laparoscopic bilateral (2/6/2012).    CURRENT MEDICATIONS  Home Medications     Reviewed by Danielle Neely (Pharmacy Tech) on 01/16/22 at 2212  Med List Status:  Complete   Medication Last Dose Status   metFORMIN (GLUCOPHAGE) 500 MG Tab 1/14/2022 Active                ALLERGIES  No Known Allergies    PHYSICAL EXAM  Vitals:    01/16/22 1406   BP: 151/99   Pulse: 89   Resp: 20   Temp: 36.4 °C (97.6 °F)   SpO2: 99%       Physical Exam  Constitutional:       Appearance: She is well-developed.   HENT:      Head: Normocephalic and atraumatic.      Right Ear: Tympanic membrane normal.      Left Ear: Tympanic membrane normal.   Eyes:      Conjunctiva/sclera: Conjunctivae normal.   Cardiovascular:      Rate and Rhythm: Normal rate and regular rhythm.   Pulmonary:      Effort: Pulmonary effort is normal.      Breath sounds: Normal breath sounds.   Abdominal:      General: Bowel sounds are normal. There is no distension.      Palpations: Abdomen is soft.      Tenderness: There is no abdominal tenderness. There is no rebound.   Musculoskeletal:      Cervical back: Normal range of motion and neck supple.   Skin:     General: Skin is warm and dry.      Findings: No rash.   Neurological:      Mental Status: She is alert and oriented to person, place, and time.      Comments: Strength is 5 out of 5 in bilateral upper and lower extremities.  Sensations intact throughout.  Patient without any associated dysmetria bilaterally.  Normal gait.  No ataxia.  Normal Romberg.  Speech is normal.  No word finding difficulties.  No associated visual field deficits.  Left side of patient's face with obvious facial droop of both forehead and lower face.  No tongue deviation.   Psychiatric:         Behavior: Behavior normal.           COURSE & MEDICAL DECISION MAKING  Pertinent Labs & Imaging studies reviewed. (See chart for details)    Patient here with presentation consistent with Bell's palsy.  Patient exam is very reassuring for this.  Patient without any recent head trauma, she has an entirely otherwise unremarkable neurologic exam.  Patient is requesting a head CT.  I discussed with patient that it  is very unlikely to reveal any abnormalities and be unlikely to add anything to her work-up but patient is very adamant that we checked.  I have therefore checked a CT head which was unremarkable.  Patient's exam is remained unremarkable throughout.  Patient denies any history of kidney problems or solitary kidney.  Patient will be started on valacyclovir and prednisone.  I discussed return precautions and close follow-up with her primary care physician.    The patient will return for worsening symptoms and is stable at the time of discharge. The patient verbalizes understanding and will comply.    FINAL IMPRESSION    1. Bell's palsy               Electronically signed by: Kam Sotomayor M.D., 1/16/2022 3:06 PM

## 2022-11-23 ENCOUNTER — OFFICE VISIT (OUTPATIENT)
Dept: URGENT CARE | Facility: PHYSICIAN GROUP | Age: 42
End: 2022-11-23
Payer: COMMERCIAL

## 2022-11-23 VITALS
RESPIRATION RATE: 16 BRPM | SYSTOLIC BLOOD PRESSURE: 122 MMHG | OXYGEN SATURATION: 98 % | DIASTOLIC BLOOD PRESSURE: 70 MMHG | BODY MASS INDEX: 28.89 KG/M2 | TEMPERATURE: 99.4 F | HEIGHT: 62 IN | WEIGHT: 157 LBS | HEART RATE: 82 BPM

## 2022-11-23 DIAGNOSIS — J04.0 LARYNGITIS: ICD-10-CM

## 2022-11-23 PROCEDURE — 99213 OFFICE O/P EST LOW 20 MIN: CPT | Performed by: PHYSICIAN ASSISTANT

## 2022-11-23 RX ORDER — MONTELUKAST SODIUM 5 MG/1
5 TABLET, CHEWABLE ORAL NIGHTLY
COMMUNITY

## 2022-11-23 RX ORDER — PREDNISONE 20 MG/1
TABLET ORAL
Qty: 8 TABLET | Refills: 0 | Status: SHIPPED | OUTPATIENT
Start: 2022-11-23 | End: 2022-09-07

## 2022-11-23 ASSESSMENT — FIBROSIS 4 INDEX: FIB4 SCORE: 0.73

## 2022-11-24 ASSESSMENT — ENCOUNTER SYMPTOMS
CHILLS: 0
DIZZINESS: 0
NAUSEA: 0
HEADACHES: 0
FEVER: 0
MYALGIAS: 0
COUGH: 0
VOMITING: 0
SORE THROAT: 1

## 2022-11-24 NOTE — PROGRESS NOTES
Subjective     Joellen Wakefield is a 42 y.o. female who presents with Sore Throat (X over a month)    HPI:  Joellen Wakefield is a 42 y.o. female who presents today for evaluation of laryngitis.  Patient reports that she started to get sick with URI symptoms approximately 1 month ago.  She was having congestion and sore throat.  The URI symptoms seem to have dissipated but she continues to have hoarse voice and voice loss.  Says that she has had similar symptoms in the past and she has had acid reflux and has been prescribed steroids to help.      Review of Systems   Constitutional:  Negative for chills and fever.   HENT:  Positive for congestion and sore throat (Initially, since resolved).         Laryngitis   Respiratory:  Negative for cough.    Gastrointestinal:  Negative for nausea and vomiting.   Musculoskeletal:  Negative for myalgias.   Neurological:  Negative for dizziness and headaches.         PMH:  has a past medical history of Allergy, Cardiac murmur (3/20/2019), Cold, Cold, Fatty liver, GERD (gastroesophageal reflux disease), Heart burn, Hyperlipidemia, Indigestion, and Nasal polyp.    She has no past medical history of Anxiety, Arrhythmia, Asthma, Blood transfusion without reported diagnosis, Cancer ([facility]), CHF (congestive heart failure) ([facility]), Clotting disorder ([facility]), COPD (chronic obstructive pulmonary disease) ([facility]), Depression, Diabetes ([facility]), Hypertension, IBD (inflammatory bowel disease), Kidney disease, Migraine, Seizure ([facility]), Substance abuse ([facility]), Thyroid disease, or Urinary tract infection.  MEDS:   Current Outpatient Medications:     montelukast (SINGULAIR) 5 MG Chew Tab, Chew 5 mg every evening., Disp: , Rfl:     predniSONE (DELTASONE) 20 MG Tab, Take 2 tabs once daily for 4 days, Disp: 8 Tablet, Rfl: 0    metFORMIN (GLUCOPHAGE) 500 MG Tab, Take 500 mg by mouth every day. (Patient not taking: Reported on 11/23/2022), Disp: , Rfl:     artificial tears (ARTIFICIAL TEARS) 1.4 %  "Solution, Administer 1 Drop into the left eye every 4 hours. (Patient not taking: Reported on 11/23/2022), Disp: 30 mL, Rfl: 0  ALLERGIES: No Known Allergies  SURGHX:   Past Surgical History:   Procedure Laterality Date    TUBAL COAGULATION LAPAROSCOPIC BILATERAL  2/6/2012    Performed by KENIA EWING at SURGERY SAME DAY [facility]    OTHER ORTHOPEDIC SURGERY      orif heel  left     SOCHX:  reports that she has never smoked. She has never used smokeless tobacco. She reports that she does not drink alcohol and does not use drugs.  FH: Family history was reviewed, no pertinent findings to report      Objective     /70 (BP Location: Left arm, Patient Position: Sitting, BP Cuff Size: Adult)   Pulse 82   Temp 37.4 °C (99.4 °F) (Temporal)   Resp 16   Ht 1.575 m (5' 2\")   Wt 71.2 kg (157 lb)   SpO2 98%   BMI 28.72 kg/m²      Physical Exam  Constitutional:       Appearance: She is well-developed.   HENT:      Head: Normocephalic and atraumatic.      Right Ear: External ear normal.      Left Ear: External ear normal.      Mouth/Throat:      Lips: Pink.      Mouth: Mucous membranes are moist.      Pharynx: Oropharynx is clear.   Eyes:      Conjunctiva/sclera: Conjunctivae normal.      Pupils: Pupils are equal, round, and reactive to light.   Cardiovascular:      Rate and Rhythm: Normal rate and regular rhythm.      Heart sounds: Normal heart sounds. No murmur heard.  Pulmonary:      Effort: Pulmonary effort is normal.      Breath sounds: Normal breath sounds. No wheezing.   Musculoskeletal:      Cervical back: Normal range of motion.   Lymphadenopathy:      Cervical: No cervical adenopathy.   Skin:     General: Skin is warm and dry.      Capillary Refill: Capillary refill takes less than 2 seconds.   Neurological:      Mental Status: She is alert and oriented to person, place, and time.   Psychiatric:         Behavior: Behavior normal.         Judgment: Judgment normal.                     "       Assessment & Plan        1. Laryngitis  - predniSONE (DELTASONE) 20 MG Tab; Take 2 tabs once daily for 4 days  Dispense: 8 Tablet; Refill: 0  Patient no longer with sore throat or URI symptoms.  Exam is within normal limits.  She has done well with prednisone in the past for her laryngitis.  Short course sent to pharmacy.  Also recommend voice rest, gargles with warm salt water, throat lozenges, tea with honey, increase fluid intake.              Differential Diagnosis, natural history, and supportive care discussed. Return to the Urgent Care or follow up with your PCP if symptoms fail to resolve, or for any new or worsening symptoms. Emergency room precautions discussed. Patient and/or family appears understanding of information.

## 2023-09-07 ENCOUNTER — OFFICE VISIT (OUTPATIENT)
Dept: URGENT CARE | Facility: PHYSICIAN GROUP | Age: 43
End: 2023-09-07
Payer: COMMERCIAL

## 2023-09-07 ENCOUNTER — HOSPITAL ENCOUNTER (OUTPATIENT)
Dept: RADIOLOGY | Facility: MEDICAL CENTER | Age: 43
End: 2023-09-07
Payer: COMMERCIAL

## 2023-09-07 VITALS
OXYGEN SATURATION: 96 % | WEIGHT: 165.57 LBS | TEMPERATURE: 98.5 F | RESPIRATION RATE: 16 BRPM | DIASTOLIC BLOOD PRESSURE: 88 MMHG | HEART RATE: 81 BPM | HEIGHT: 61 IN | SYSTOLIC BLOOD PRESSURE: 124 MMHG | BODY MASS INDEX: 31.26 KG/M2

## 2023-09-07 DIAGNOSIS — R10.32 LEFT LOWER QUADRANT ABDOMINAL PAIN: ICD-10-CM

## 2023-09-07 PROCEDURE — 3079F DIAST BP 80-89 MM HG: CPT

## 2023-09-07 PROCEDURE — 3074F SYST BP LT 130 MM HG: CPT

## 2023-09-07 PROCEDURE — 74177 CT ABD & PELVIS W/CONTRAST: CPT

## 2023-09-07 PROCEDURE — 700117 HCHG RX CONTRAST REV CODE 255

## 2023-09-07 PROCEDURE — 99213 OFFICE O/P EST LOW 20 MIN: CPT

## 2023-09-07 RX ADMIN — IOHEXOL 100 ML: 350 INJECTION, SOLUTION INTRAVENOUS at 18:31

## 2023-09-07 NOTE — PROGRESS NOTES
"Subjective     Joellen Wakefield is a 42 y.o. female who presents with Abdominal Pain (LLQ pain x3 days. )            HPI patient states pain started approximately 3 days ago.  She says it is her left side of her abdomen.  She says it will hurt with certain movements, sometimes with a deep breath.  She states she thought maybe she just had some gas and has been taking some herbal teas.  This did not alleviate the symptoms.  She said the pain is not sharp it is achy, it does not radiate  She states yesterday she ate some spicy pork for dinner and feels like it was a little worse afterwards  She denies any nausea, vomiting or fevers  She denies any diarrhea or constipation    She has intermittent heartburn which she will take omeprazole for.  She states she usually tries to just modify her diet  She states she is currently on her menstrual cycle, she states this is not her normal menstrual cramping.  She has no history of ovarian cysts.    ROS Same as above       No Known Allergies    Current Outpatient Medications   Medication Sig    montelukast (SINGULAIR) 5 MG Chew Tab Chew 5 mg every evening.        Past Medical History:   Diagnosis Date    Allergy     Cardiac murmur 3/20/2019    Cold     hoarse voice will go to  [place] office today2/3    Cold     mono in December on Z pack    Fatty liver     GERD (gastroesophageal reflux disease)     Heart burn     starting prilosec 2/3    Hyperlipidemia     Indigestion     Nasal polyp           Objective     /88 (BP Location: Right arm, Patient Position: Sitting, BP Cuff Size: Adult)   Pulse 81   Temp 36.9 °C (98.5 °F) (Temporal)   Resp 16   Ht 1.549 m (5' 1\")   Wt 75.1 kg (165 lb 9.1 oz)   LMP 09/06/2023 (Exact Date)   SpO2 96%   BMI 31.28 kg/m²      Physical Exam  Constitutional:       Appearance: Normal appearance. She is not ill-appearing.   Eyes:      Extraocular Movements: Extraocular movements intact.   Cardiovascular:      Rate and Rhythm: Normal " rate and regular rhythm.   Pulmonary:      Effort: Pulmonary effort is normal.      Breath sounds: No wheezing, rhonchi or rales.   Abdominal:      General: Bowel sounds are normal.      Palpations: Abdomen is soft.      Tenderness: There is abdominal tenderness in the left lower quadrant. There is no right CVA tenderness, left CVA tenderness, guarding or rebound. Negative signs include Perez's sign and McBurney's sign.   Musculoskeletal:         General: Normal range of motion.      Cervical back: Normal range of motion and neck supple.   Skin:     General: Skin is warm and dry.   Neurological:      Mental Status: She is alert and oriented to person, place, and time.           Assessment & Plan     Patient comes in today due to 3 days of left sided abdominal discomfort.  She states it is worse with certain movements and intermittently with a deep breath.  She has not had any constipation, she has not had any diarrhea.  She denies any fevers.    On exam normal lung sounds, no wheezing no rhonchi, SPO2 96% clinic today.  Heart sounds normal, regular rhythm. She has normal bowel sounds in all 4 quadrants.  There is no bilateral CVA tenderness with percussion or palpation.  There is mild diffuse abdominal tenderness to the left lower quadrant with palpation.  No guarding, no rebound.  Negative Perez sign, negative McBurney sign.  Discussed CT scan to rule out diverticulitis.  Patient agreeable to plan.  Patient active on MyChart, discussed we will notify her via [employer] once results are available.  If any medications need to be called in patient's pharmacy verified at [facility] on [address].  Differential diagnosis, natural history, supportive care, and indications for immediate follow-up were discussed.    Patient states she is getting established with a new primary care provider next month on the 18th.          1. Left lower quadrant abdominal pain  CT-ABDOMEN-PELVIS WITH        CT results:   IMPRESSION:     1.  No acute  abnormality.  2.  Hepatic steatosis.  3.  A probable intramural fibroid in the uterine fundus.    Patient updated via phone on CT results.  Discussed pain likely from fibroid in the uterine fundus.  Referral placed to gynecology.

## 2023-12-09 ENCOUNTER — HOSPITAL ENCOUNTER (OUTPATIENT)
Dept: LAB | Facility: MEDICAL CENTER | Age: 43
End: 2023-12-09
Payer: COMMERCIAL

## 2023-12-09 LAB
ALBUMIN SERPL BCP-MCNC: 4.3 G/DL (ref 3.2–4.9)
ALBUMIN/GLOB SERPL: 1.3 G/DL
ALP SERPL-CCNC: 92 U/L (ref 30–99)
ALT SERPL-CCNC: 32 U/L (ref 2–50)
ANION GAP SERPL CALC-SCNC: 10 MMOL/L (ref 7–16)
AST SERPL-CCNC: 18 U/L (ref 12–45)
BILIRUB SERPL-MCNC: 0.3 MG/DL (ref 0.1–1.5)
BUN SERPL-MCNC: 11 MG/DL (ref 8–22)
CALCIUM ALBUM COR SERPL-MCNC: 9.1 MG/DL (ref 8.5–10.5)
CALCIUM SERPL-MCNC: 9.3 MG/DL (ref 8.5–10.5)
CHLORIDE SERPL-SCNC: 107 MMOL/L (ref 96–112)
CHOLEST SERPL-MCNC: 183 MG/DL (ref 100–199)
CO2 SERPL-SCNC: 21 MMOL/L (ref 20–33)
CREAT SERPL-MCNC: 0.67 MG/DL (ref 0.5–1.4)
EST. AVERAGE GLUCOSE BLD GHB EST-MCNC: 131 MG/DL
GFR SERPLBLD CREATININE-BSD FMLA CKD-EPI: 110 ML/MIN/1.73 M 2
GLOBULIN SER CALC-MCNC: 3.3 G/DL (ref 1.9–3.5)
GLUCOSE SERPL-MCNC: 100 MG/DL (ref 65–99)
HBA1C MFR BLD: 6.2 % (ref 4–5.6)
HDLC SERPL-MCNC: 47 MG/DL
LDLC SERPL CALC-MCNC: 111 MG/DL
POTASSIUM SERPL-SCNC: 4.6 MMOL/L (ref 3.6–5.5)
PROT SERPL-MCNC: 7.6 G/DL (ref 6–8.2)
SODIUM SERPL-SCNC: 138 MMOL/L (ref 135–145)
TRIGL SERPL-MCNC: 124 MG/DL (ref 0–149)
TSH SERPL DL<=0.005 MIU/L-ACNC: 2.08 UIU/ML (ref 0.38–5.33)

## 2023-12-09 PROCEDURE — 83036 HEMOGLOBIN GLYCOSYLATED A1C: CPT

## 2023-12-09 PROCEDURE — 36415 COLL VENOUS BLD VENIPUNCTURE: CPT

## 2023-12-09 PROCEDURE — 80061 LIPID PANEL: CPT

## 2023-12-09 PROCEDURE — 83013 H PYLORI (C-13) BREATH: CPT

## 2023-12-09 PROCEDURE — 84443 ASSAY THYROID STIM HORMONE: CPT

## 2023-12-09 PROCEDURE — 80053 COMPREHEN METABOLIC PANEL: CPT

## 2023-12-11 LAB — UREA BREATH TEST QL: POSITIVE

## 2024-03-23 ENCOUNTER — HOSPITAL ENCOUNTER (OUTPATIENT)
Dept: LAB | Facility: MEDICAL CENTER | Age: 44
End: 2024-03-23
Attending: SPECIALIST
Payer: COMMERCIAL

## 2024-03-23 LAB
ALBUMIN SERPL BCP-MCNC: 4.4 G/DL (ref 3.2–4.9)
ALBUMIN/GLOB SERPL: 1.5 G/DL
ALP SERPL-CCNC: 89 U/L (ref 30–99)
ALT SERPL-CCNC: 25 U/L (ref 2–50)
ANION GAP SERPL CALC-SCNC: 12 MMOL/L (ref 7–16)
AST SERPL-CCNC: 21 U/L (ref 12–45)
BASOPHILS # BLD AUTO: 0.4 % (ref 0–1.8)
BASOPHILS # BLD: 0.02 K/UL (ref 0–0.12)
BILIRUB SERPL-MCNC: 0.4 MG/DL (ref 0.1–1.5)
BUN SERPL-MCNC: 10 MG/DL (ref 8–22)
CALCIUM ALBUM COR SERPL-MCNC: 8.6 MG/DL (ref 8.5–10.5)
CALCIUM SERPL-MCNC: 8.9 MG/DL (ref 8.5–10.5)
CHLORIDE SERPL-SCNC: 107 MMOL/L (ref 96–112)
CO2 SERPL-SCNC: 21 MMOL/L (ref 20–33)
CREAT SERPL-MCNC: 0.59 MG/DL (ref 0.5–1.4)
EOSINOPHIL # BLD AUTO: 0.08 K/UL (ref 0–0.51)
EOSINOPHIL NFR BLD: 1.5 % (ref 0–6.9)
ERYTHROCYTE [DISTWIDTH] IN BLOOD BY AUTOMATED COUNT: 43.3 FL (ref 35.9–50)
GFR SERPLBLD CREATININE-BSD FMLA CKD-EPI: 113 ML/MIN/1.73 M 2
GLOBULIN SER CALC-MCNC: 3 G/DL (ref 1.9–3.5)
GLUCOSE SERPL-MCNC: 111 MG/DL (ref 65–99)
HCT VFR BLD AUTO: 39.6 % (ref 37–47)
HGB BLD-MCNC: 12.6 G/DL (ref 12–16)
IMM GRANULOCYTES # BLD AUTO: 0.01 K/UL (ref 0–0.11)
IMM GRANULOCYTES NFR BLD AUTO: 0.2 % (ref 0–0.9)
LYMPHOCYTES # BLD AUTO: 2.21 K/UL (ref 1–4.8)
LYMPHOCYTES NFR BLD: 41.2 % (ref 22–41)
MCH RBC QN AUTO: 25.2 PG (ref 27–33)
MCHC RBC AUTO-ENTMCNC: 31.8 G/DL (ref 32.2–35.5)
MCV RBC AUTO: 79.2 FL (ref 81.4–97.8)
MONOCYTES # BLD AUTO: 0.39 K/UL (ref 0–0.85)
MONOCYTES NFR BLD AUTO: 7.3 % (ref 0–13.4)
NEUTROPHILS # BLD AUTO: 2.65 K/UL (ref 1.82–7.42)
NEUTROPHILS NFR BLD: 49.4 % (ref 44–72)
NRBC # BLD AUTO: 0 K/UL
NRBC BLD-RTO: 0 /100 WBC (ref 0–0.2)
PLATELET # BLD AUTO: 315 K/UL (ref 164–446)
PMV BLD AUTO: 10.6 FL (ref 9–12.9)
POTASSIUM SERPL-SCNC: 4.2 MMOL/L (ref 3.6–5.5)
PROT SERPL-MCNC: 7.4 G/DL (ref 6–8.2)
RBC # BLD AUTO: 5 M/UL (ref 4.2–5.4)
SODIUM SERPL-SCNC: 140 MMOL/L (ref 135–145)
WBC # BLD AUTO: 5.4 K/UL (ref 4.8–10.8)

## 2024-03-23 PROCEDURE — 85025 COMPLETE CBC W/AUTO DIFF WBC: CPT

## 2024-03-23 PROCEDURE — 36415 COLL VENOUS BLD VENIPUNCTURE: CPT

## 2024-03-23 PROCEDURE — 80053 COMPREHEN METABOLIC PANEL: CPT

## 2025-02-11 ENCOUNTER — HOSPITAL ENCOUNTER (OUTPATIENT)
Dept: LAB | Facility: MEDICAL CENTER | Age: 45
End: 2025-02-11
Attending: STUDENT IN AN ORGANIZED HEALTH CARE EDUCATION/TRAINING PROGRAM
Payer: COMMERCIAL

## 2025-02-11 LAB
ALBUMIN SERPL BCP-MCNC: 4.1 G/DL (ref 3.2–4.9)
ALBUMIN/GLOB SERPL: 1.3 G/DL
ALP SERPL-CCNC: 83 U/L (ref 30–99)
ALT SERPL-CCNC: 33 U/L (ref 2–50)
ANION GAP SERPL CALC-SCNC: 9 MMOL/L (ref 7–16)
AST SERPL-CCNC: 25 U/L (ref 12–45)
BILIRUB SERPL-MCNC: 0.3 MG/DL (ref 0.1–1.5)
BUN SERPL-MCNC: 10 MG/DL (ref 8–22)
CALCIUM ALBUM COR SERPL-MCNC: 9 MG/DL (ref 8.5–10.5)
CALCIUM SERPL-MCNC: 9.1 MG/DL (ref 8.5–10.5)
CHLORIDE SERPL-SCNC: 106 MMOL/L (ref 96–112)
CHOLEST SERPL-MCNC: 191 MG/DL (ref 100–199)
CO2 SERPL-SCNC: 24 MMOL/L (ref 20–33)
CREAT SERPL-MCNC: 0.73 MG/DL (ref 0.5–1.4)
EST. AVERAGE GLUCOSE BLD GHB EST-MCNC: 128 MG/DL
FASTING STATUS PATIENT QL REPORTED: NORMAL
GFR SERPLBLD CREATININE-BSD FMLA CKD-EPI: 102 ML/MIN/1.73 M 2
GLOBULIN SER CALC-MCNC: 3.1 G/DL (ref 1.9–3.5)
GLUCOSE SERPL-MCNC: 106 MG/DL (ref 65–99)
HBA1C MFR BLD: 6.1 % (ref 4–5.6)
HDLC SERPL-MCNC: 41 MG/DL
LDLC SERPL CALC-MCNC: 117 MG/DL
POTASSIUM SERPL-SCNC: 4 MMOL/L (ref 3.6–5.5)
PROT SERPL-MCNC: 7.2 G/DL (ref 6–8.2)
SODIUM SERPL-SCNC: 139 MMOL/L (ref 135–145)
TRIGL SERPL-MCNC: 166 MG/DL (ref 0–149)

## 2025-02-11 PROCEDURE — 80061 LIPID PANEL: CPT

## 2025-02-11 PROCEDURE — 83036 HEMOGLOBIN GLYCOSYLATED A1C: CPT

## 2025-02-11 PROCEDURE — 36415 COLL VENOUS BLD VENIPUNCTURE: CPT

## 2025-02-11 PROCEDURE — 80053 COMPREHEN METABOLIC PANEL: CPT
